# Patient Record
Sex: FEMALE | Race: WHITE | URBAN - METROPOLITAN AREA
[De-identification: names, ages, dates, MRNs, and addresses within clinical notes are randomized per-mention and may not be internally consistent; named-entity substitution may affect disease eponyms.]

---

## 2017-01-01 ENCOUNTER — NURSING HOME VISIT (OUTPATIENT)
Dept: GERIATRICS | Facility: CLINIC | Age: 82
End: 2017-01-01
Payer: COMMERCIAL

## 2017-01-01 ENCOUNTER — DOCUMENTATION ONLY (OUTPATIENT)
Dept: OTHER | Facility: CLINIC | Age: 82
End: 2017-01-01

## 2017-01-01 ENCOUNTER — TRANSFERRED RECORDS (OUTPATIENT)
Dept: HEALTH INFORMATION MANAGEMENT | Facility: CLINIC | Age: 82
End: 2017-01-01

## 2017-01-01 ENCOUNTER — TELEPHONE (OUTPATIENT)
Dept: GERIATRICS | Facility: CLINIC | Age: 82
End: 2017-01-01

## 2017-01-01 ENCOUNTER — NURSING HOME VISIT (OUTPATIENT)
Dept: GERIATRICS | Facility: CLINIC | Age: 82
End: 2017-01-01

## 2017-01-01 VITALS
WEIGHT: 160 LBS | BODY MASS INDEX: 30.21 KG/M2 | HEART RATE: 63 BPM | HEIGHT: 61 IN | TEMPERATURE: 98.4 F | SYSTOLIC BLOOD PRESSURE: 168 MMHG | DIASTOLIC BLOOD PRESSURE: 77 MMHG | RESPIRATION RATE: 18 BRPM

## 2017-01-01 VITALS
SYSTOLIC BLOOD PRESSURE: 155 MMHG | WEIGHT: 139 LBS | HEART RATE: 67 BPM | TEMPERATURE: 98 F | BODY MASS INDEX: 26.28 KG/M2 | DIASTOLIC BLOOD PRESSURE: 94 MMHG | RESPIRATION RATE: 20 BRPM

## 2017-01-01 VITALS
TEMPERATURE: 97.6 F | WEIGHT: 123 LBS | SYSTOLIC BLOOD PRESSURE: 144 MMHG | DIASTOLIC BLOOD PRESSURE: 76 MMHG | BODY MASS INDEX: 23.24 KG/M2 | HEART RATE: 68 BPM

## 2017-01-01 VITALS
BODY MASS INDEX: 25.7 KG/M2 | DIASTOLIC BLOOD PRESSURE: 69 MMHG | WEIGHT: 136 LBS | RESPIRATION RATE: 15 BRPM | SYSTOLIC BLOOD PRESSURE: 150 MMHG | TEMPERATURE: 97.9 F | HEART RATE: 68 BPM

## 2017-01-01 VITALS
RESPIRATION RATE: 20 BRPM | HEIGHT: 61 IN | WEIGHT: 139 LBS | HEART RATE: 64 BPM | TEMPERATURE: 98 F | DIASTOLIC BLOOD PRESSURE: 62 MMHG | BODY MASS INDEX: 26.24 KG/M2 | SYSTOLIC BLOOD PRESSURE: 140 MMHG

## 2017-01-01 VITALS
SYSTOLIC BLOOD PRESSURE: 166 MMHG | WEIGHT: 143 LBS | DIASTOLIC BLOOD PRESSURE: 77 MMHG | HEART RATE: 67 BPM | TEMPERATURE: 97.5 F | HEIGHT: 61 IN | BODY MASS INDEX: 27 KG/M2 | RESPIRATION RATE: 18 BRPM

## 2017-01-01 VITALS
HEART RATE: 107 BPM | HEIGHT: 61 IN | TEMPERATURE: 97.5 F | BODY MASS INDEX: 22.84 KG/M2 | DIASTOLIC BLOOD PRESSURE: 64 MMHG | RESPIRATION RATE: 18 BRPM | SYSTOLIC BLOOD PRESSURE: 137 MMHG | WEIGHT: 121 LBS

## 2017-01-01 VITALS
HEIGHT: 61 IN | TEMPERATURE: 97.7 F | BODY MASS INDEX: 22.84 KG/M2 | DIASTOLIC BLOOD PRESSURE: 64 MMHG | SYSTOLIC BLOOD PRESSURE: 113 MMHG | HEART RATE: 98 BPM | WEIGHT: 121 LBS | RESPIRATION RATE: 14 BRPM

## 2017-01-01 VITALS
WEIGHT: 126 LBS | SYSTOLIC BLOOD PRESSURE: 122 MMHG | DIASTOLIC BLOOD PRESSURE: 65 MMHG | HEIGHT: 61 IN | TEMPERATURE: 97.7 F | RESPIRATION RATE: 16 BRPM | BODY MASS INDEX: 23.79 KG/M2 | HEART RATE: 90 BPM

## 2017-01-01 VITALS
DIASTOLIC BLOOD PRESSURE: 64 MMHG | SYSTOLIC BLOOD PRESSURE: 142 MMHG | HEART RATE: 66 BPM | TEMPERATURE: 97.7 F | RESPIRATION RATE: 16 BRPM | BODY MASS INDEX: 23.62 KG/M2 | WEIGHT: 125 LBS

## 2017-01-01 VITALS
SYSTOLIC BLOOD PRESSURE: 141 MMHG | HEIGHT: 61 IN | TEMPERATURE: 97.7 F | BODY MASS INDEX: 22.84 KG/M2 | DIASTOLIC BLOOD PRESSURE: 67 MMHG | WEIGHT: 121 LBS | HEART RATE: 75 BPM

## 2017-01-01 VITALS — HEART RATE: 98 BPM | RESPIRATION RATE: 20 BRPM

## 2017-01-01 VITALS
SYSTOLIC BLOOD PRESSURE: 125 MMHG | TEMPERATURE: 98 F | WEIGHT: 140 LBS | RESPIRATION RATE: 16 BRPM | BODY MASS INDEX: 26.45 KG/M2 | DIASTOLIC BLOOD PRESSURE: 63 MMHG | HEART RATE: 62 BPM

## 2017-01-01 DIAGNOSIS — R53.2 FUNCTIONAL QUADRIPLEGIA (H): ICD-10-CM

## 2017-01-01 DIAGNOSIS — K59.01 SLOW TRANSIT CONSTIPATION: ICD-10-CM

## 2017-01-01 DIAGNOSIS — F03.90 DEMENTIA WITHOUT BEHAVIORAL DISTURBANCE, UNSPECIFIED DEMENTIA TYPE: ICD-10-CM

## 2017-01-01 DIAGNOSIS — S81.001A OPEN WOUND OF KNEE, LEG, AND ANKLE, RIGHT, INITIAL ENCOUNTER: Primary | ICD-10-CM

## 2017-01-01 DIAGNOSIS — S81.801D WOUND OF RIGHT LEG, SUBSEQUENT ENCOUNTER: ICD-10-CM

## 2017-01-01 DIAGNOSIS — R63.4 LOSS OF WEIGHT: ICD-10-CM

## 2017-01-01 DIAGNOSIS — L02.419 CELLULITIS AND ABSCESS OF LEG: Primary | ICD-10-CM

## 2017-01-01 DIAGNOSIS — S91.101D OPEN WOUND OF RIGHT GREAT TOE, SUBSEQUENT ENCOUNTER: ICD-10-CM

## 2017-01-01 DIAGNOSIS — R23.4 ESCHAR OF HEEL: ICD-10-CM

## 2017-01-01 DIAGNOSIS — I73.9 PVD (PERIPHERAL VASCULAR DISEASE) (H): ICD-10-CM

## 2017-01-01 DIAGNOSIS — S91.001A OPEN WOUND OF KNEE, LEG, AND ANKLE, RIGHT, INITIAL ENCOUNTER: Primary | ICD-10-CM

## 2017-01-01 DIAGNOSIS — L03.115 CELLULITIS OF RIGHT LOWER EXTREMITY: Primary | ICD-10-CM

## 2017-01-01 DIAGNOSIS — Z71.89 ADVANCED DIRECTIVES, COUNSELING/DISCUSSION: Chronic | ICD-10-CM

## 2017-01-01 DIAGNOSIS — R62.7 FAILURE TO THRIVE IN ADULT: Primary | ICD-10-CM

## 2017-01-01 DIAGNOSIS — S81.801D WOUND OF RIGHT LEG, SUBSEQUENT ENCOUNTER: Primary | ICD-10-CM

## 2017-01-01 DIAGNOSIS — L03.119 CELLULITIS AND ABSCESS OF LEG: Primary | ICD-10-CM

## 2017-01-01 DIAGNOSIS — S81.801D WOUND OF RIGHT LOWER EXTREMITY, SUBSEQUENT ENCOUNTER: Primary | ICD-10-CM

## 2017-01-01 DIAGNOSIS — S81.801A OPEN WOUND OF KNEE, LEG, AND ANKLE, RIGHT, INITIAL ENCOUNTER: Primary | ICD-10-CM

## 2017-01-01 DIAGNOSIS — L89.152: ICD-10-CM

## 2017-01-01 DIAGNOSIS — R62.7 ADULT FAILURE TO THRIVE: Primary | ICD-10-CM

## 2017-01-01 DIAGNOSIS — L89.92 DECUBITUS SKIN ULCER, STAGE II: ICD-10-CM

## 2017-01-01 DIAGNOSIS — R63.4 LOSS OF WEIGHT: Primary | ICD-10-CM

## 2017-01-01 DIAGNOSIS — M15.9 OSTEOARTHRITIS OF MULTIPLE JOINTS, UNSPECIFIED OSTEOARTHRITIS TYPE: ICD-10-CM

## 2017-01-01 DIAGNOSIS — I10 BENIGN ESSENTIAL HYPERTENSION: ICD-10-CM

## 2017-01-01 DIAGNOSIS — S81.801S OPEN WOUND OF LOWER LIMB, RIGHT, SEQUELA: Primary | ICD-10-CM

## 2017-01-01 DIAGNOSIS — R23.4 ESCHAR OF TOE: ICD-10-CM

## 2017-01-01 LAB
BUN SERPL-MCNC: 31 MG/DL (ref 9–26)
BUN SERPL-MCNC: 33 MG/DL (ref 9–26)
BUN SERPL-MCNC: 40 MG/DL (ref 9–26)
CALCIUM SERPL-MCNC: 10 MG/DL (ref 8.4–10.2)
CALCIUM SERPL-MCNC: 9.8 MG/DL (ref 8.4–10.2)
CALCIUM SERPL-MCNC: 9.9 MG/DL (ref 8.4–10.2)
CHLORIDE SERPLBLD-SCNC: 102 MMOL/L (ref 98–109)
CHLORIDE SERPLBLD-SCNC: 102 MMOL/L (ref 98–109)
CHLORIDE SERPLBLD-SCNC: 104 MMOL/L (ref 98–109)
CO2 SERPL-SCNC: 22 MMOL/L (ref 22–31)
CREAT SERPL-MCNC: 1.07 MG/DL (ref 0.55–1.02)
CREAT SERPL-MCNC: 1.12 MG/DL (ref 0.55–1.02)
CREAT SERPL-MCNC: 1.19 MG/DL (ref 0.55–1.02)
DIFFERENTIAL: ABNORMAL
DIFFERENTIAL: NORMAL
DIFFERENTIAL: NORMAL
ERYTHROCYTE [DISTWIDTH] IN BLOOD BY AUTOMATED COUNT: 12.9 % (ref 11–15)
ERYTHROCYTE [DISTWIDTH] IN BLOOD BY AUTOMATED COUNT: 13.4 % (ref 11–15)
ERYTHROCYTE [DISTWIDTH] IN BLOOD BY AUTOMATED COUNT: 13.8 % (ref 11–15)
GFR SERPL CREATININE-BSD FRML MDRD: 39 ML/MIN/1.73M2
GFR SERPL CREATININE-BSD FRML MDRD: 42 ML/MIN/1.73M2
GFR SERPL CREATININE-BSD FRML MDRD: 44 ML/MIN/1.73M2
GLUCOSE SERPL-MCNC: 103 MG/DL (ref 70–100)
GLUCOSE SERPL-MCNC: 128 MG/DL (ref 70–100)
GLUCOSE SERPL-MCNC: 167 MG/DL (ref 70–100)
HCT VFR BLD AUTO: 34.8 % (ref 35–46)
HCT VFR BLD AUTO: 39.5 % (ref 35–46)
HCT VFR BLD AUTO: 42 % (ref 35–46)
HEMOGLOBIN: 11.1 G/DL (ref 11.8–15.5)
HEMOGLOBIN: 12.7 G/DL (ref 11.8–15.5)
HEMOGLOBIN: 13.9 G/DL (ref 11.8–15.5)
MCV RBC AUTO: 91.9 FL (ref 80–100)
MCV RBC AUTO: 92.5 FL (ref 80–100)
MCV RBC AUTO: 92.8 FL (ref 80–100)
PLATELET # BLD AUTO: 335 K/CMM (ref 140–450)
PLATELET # BLD AUTO: 348 K/CMM (ref 140–450)
PLATELET # BLD AUTO: 567 K/CMM (ref 140–450)
POTASSIUM SERPL-SCNC: 4.5 MMOL/L (ref 3.5–5.2)
POTASSIUM SERPL-SCNC: 4.8 MMOL/L (ref 3.5–5.2)
POTASSIUM SERPL-SCNC: 5 MMOL/L (ref 3.5–5.2)
RBC # BLD AUTO: 3.75 M/CMM (ref 3.7–5.2)
RBC # BLD AUTO: 4.27 M/CMM (ref 3.7–5.2)
RBC # BLD AUTO: 4.57 M/CMM (ref 3.7–5.2)
SODIUM SERPL-SCNC: 135 MMOL/L (ref 136–145)
SODIUM SERPL-SCNC: 135 MMOL/L (ref 136–145)
SODIUM SERPL-SCNC: 138 MMOL/L (ref 136–145)
WBC # BLD AUTO: 19.1 K/CMM (ref 3.8–11)
WBC # BLD AUTO: 7.8 K/CMM (ref 3.8–11)
WBC # BLD AUTO: 8.6 K/CMM (ref 3.8–11)

## 2017-01-01 PROCEDURE — 99207 ZZC NO CHARGE LOS: CPT | Performed by: INTERNAL MEDICINE

## 2017-01-01 PROCEDURE — 99309 SBSQ NF CARE MODERATE MDM 30: CPT | Performed by: NURSE PRACTITIONER

## 2017-01-01 PROCEDURE — 99310 SBSQ NF CARE HIGH MDM 45: CPT | Mod: GW | Performed by: NURSE PRACTITIONER

## 2017-01-01 PROCEDURE — 99207 ZZC CDG-CORRECTLY CODED, REVIEWED AND AGREE: CPT | Performed by: NURSE PRACTITIONER

## 2017-01-01 PROCEDURE — 99310 SBSQ NF CARE HIGH MDM 45: CPT | Performed by: NURSE PRACTITIONER

## 2017-01-01 PROCEDURE — 99309 SBSQ NF CARE MODERATE MDM 30: CPT | Mod: GW | Performed by: NURSE PRACTITIONER

## 2017-01-01 PROCEDURE — 99318 ZZC ANNUAL NURSING FAC ASSESSMNT, STABLE: CPT | Performed by: NURSE PRACTITIONER

## 2017-01-01 RX ORDER — DOXYCYCLINE HYCLATE 100 MG
100 TABLET ORAL 2 TIMES DAILY
Qty: 20 TABLET | Refills: 0
Start: 2017-01-01 | End: 2017-01-01

## 2017-01-01 RX ORDER — MORPHINE SULFATE 20 MG/ML
SOLUTION ORAL
Qty: 30 ML | Refills: 0
Start: 2017-01-01

## 2017-01-01 RX ORDER — ACETAMINOPHEN 650 MG/1
650 SUPPOSITORY RECTAL EVERY 6 HOURS PRN
COMMUNITY

## 2017-01-01 RX ORDER — DOXYCYCLINE 100 MG/1
100 CAPSULE ORAL 2 TIMES DAILY
Qty: 20 CAPSULE | Refills: 0
Start: 2017-01-01 | End: 2017-01-01

## 2017-01-01 RX ORDER — SENNOSIDES 8.6 MG
1 TABLET ORAL 2 TIMES DAILY PRN
COMMUNITY

## 2017-01-01 RX ORDER — BISACODYL 10 MG
10 SUPPOSITORY, RECTAL RECTAL EVERY OTHER DAY
Qty: 25 SUPPOSITORY | Refills: 1
Start: 2017-01-01

## 2017-01-01 RX ORDER — ATROPINE SULFATE 10 MG/ML
2 SOLUTION/ DROPS OPHTHALMIC EVERY 4 HOURS PRN
COMMUNITY

## 2017-01-01 RX ORDER — ALBUTEROL SULFATE 0.83 MG/ML
1 SOLUTION RESPIRATORY (INHALATION) EVERY 4 HOURS PRN
COMMUNITY

## 2017-01-01 ASSESSMENT — PATIENT HEALTH QUESTIONNAIRE - PHQ9: SUM OF ALL RESPONSES TO PHQ QUESTIONS 1-9: 0

## 2017-01-11 NOTE — PROGRESS NOTES
[facility]    Chief Complaint   Patient presents with     Nursing Home Acute     HPI:    Leigha Keenan is a 89 year old  (1/8/1922), who is being seen today for an episodic care visit at [facility]. Today's concern is:  Open wound of knee, leg, and ankle, right, initial encounter  Asked to see pt today by ns staff.  Pt sustained right leg skin wound today during transfer with EZ stand.  Pt is non verbal due to her dementia.    Functional quadriplegia (HCC), due to advanced dementia  Resident is totally dependent on staff for: Bed/chair repositioning, ADL's, feeding, toileting (incontinent of bowel and bladder), transfers (mechanical lift).  Resident is non-ambulatory and has no purposeful movement.     ALLERGIES: Penicillins and Sulfa drugs  Past Medical, Surgical, Family and Social History reviewed and updated in EPIC.    Current Outpatient Prescriptions   Medication Sig Dispense Refill     lisinopril (PRINIVIL,ZESTRIL) 10 MG tablet 15 mg qd. 90 tablet 1     morphine sulfate HIGH CONCENTRATE (ROXANOL *CONCETRATED*) 100 MG/5ML concentrated solution Take 0.125 mLs (2.5 mg) by mouth every 4 hours as needed for shortness of breath / dyspnea or moderate to severe pain 30 mL 0     metoprolol (LOPRESSOR) 25 MG tablet Take 3 tablets (75 mg) by mouth 2 times daily 60 tablet      amLODIPine (NORVASC) 10 MG tablet Take 1 tablet (10 mg) by mouth daily 90 tablet 3     sodium chloride (OCEAN) 0.65 % nasal spray Spray 2 sprays into both nostrils daily as needed for congestion 2 sprays to each nostril tid and prn dx nasal dryness.       albuterol (2.5 MG/3ML) 0.083% nebulizer solution Take 1 vial by nebulization every 4 hours as needed for shortness of breath / dyspnea or wheezing       ARTIFICIAL TEARS 0.1-0.3 % SOLN Apply 2 drops to eye 3 times daily       Polyethylene Glycol 3350 (MIRALAX PO) Take 17 g by mouth daily.        latanoprost (XALATAN) 0.005 % ophthalmic solution Place 1  drop into both eyes At Bedtime.       acetaminophen (TYLENOL) 500 MG tablet Take 1,000 mg by mouth 3 times daily.       Medications reviewed:  Medications reconciled to facility chart and changes were made to reflect current medications as identified as above med list. Below are the changes that were made:   Medications stopped since last EPIC medication reconciliation:   There are no discontinued medications.    Medications started since last [facility] medication reconciliation:  No orders of the defined types were placed in this encounter.     Patient Active Problem List   Diagnosis     Osteoporosis     Hyperlipidemia     Glaucoma     DJD (degenerative joint disease)     Foreign body in eyeball, left incidentally noted on head CT - MRI contraindicated     Advance Care Planning     Osteoarthritis of multiple joints, unspecified osteoarthritis type     Dementia without behavioral disturbance, unspecified dementia type     Slow transit constipation     Functional quadriplegia (HCC), due to advanced dementia     Benign essential hypertension       REVIEW OF SYSTEMS:  Unobtainable secondary to cognitive impairment or aphasia.    Physical Exam:  /94 mmHg  Pulse 67  Temp(Src) 98  F (36.7  C)  Resp 20  Wt 139 lb (63.05 kg)  GENERAL APPEARANCE: White, female resting in bed.  No verbalization.  Calm.  Sleepy but does open eyes to stimulation. .   SKIN:  Large open wound on right shin involving most of lower leg.  Skin has been superficially torn from most of wound and thin old skin is now resting at edges of wound.  Area is approx 19 cm X 7 cm.  Area cleansed and old skin debrided from wound.  Area then covered with bacitracin, adaptic and gauze. Pt tolerated procedure well with only minimal movement of leg.    Recent Labs:    CBC RESULTS:   Recent Labs   Lab Test 11/21/16 06/20/16 04/06/16 06/20/13   WBC  8.2   --    --   7.8   < >  14   RBC  4.28   --    --   3.93   < >  4.18   HGB  12.9  13.8   < >  11.8   < >   12.9   HCT  39.9   --    --   35.6   < >  38.6   MCV  93.2   --    --   90.6   < >  92   MCH   --    --    --    --    --   30.8   MCHC   --    --    --    --    --   33.4   RDW  13.5   --    --   13.4   < >  14.4   PLT  311   --    --   247   < >  256    < > = values in this interval not displayed.       Last Basic Metabolic Panel:  Recent Labs   Lab Test 11/21/16 10/03/16   NA  138  138   POTASSIUM  4.4  4.2   CHLORIDE  103  103   DAYDAY  9.5  9.7   CO2  23  26   BUN  37  35*   CR  1.32  1.18*   GLC  104*  102*     GFR ESTIMATE   Date Value Ref Range Status   11/21/2016 34 ml/min/1.73m2 Final   10/03/2016 39 >60 ml/min/1.73m2 Final   07/21/2016 46* 61 ml/min/1.73m2 Final   06/20/2016 45* 61 ml/min/1.73m2 Final   04/18/2016 60 ml/min/1.73m2 Final     Assessment/Plan:  (S81.001A,  S81.801A,  S91.001A) Open wound of knee, leg, and ankle, right, initial encounter  (primary encounter diagnosis)  Comment: S/P debridement of old non viable skin  Plan: Cleanse area daily and apply bacitracin, adaptic and gauze.  Reevaluate in am.     (R53.2) Functional quadriplegia (HCC), due to advanced dementia  Comment: Status: Chronic  - decline expected. Functional quadriplegia due to:  End Stage Dementia. Is totally dependent on staff for all cares.   Plan:  Continue current psychological and physical support. Monitor skin integrity, weight and comfort levels. Refer to advanced care plan/POLST. Nsg will change transfer device to a lift from the EZ stand for safety.    Total time spent with patient visit was 25 min including patient visit, phone call to patient contact and wound debridement.. Greater than 50% of total time spent with counseling and coordinating care.    Family Communication:  DaughterUzma, updated.     Electronically signed by  SHELLI Hussein CNP

## 2017-01-12 PROBLEM — I73.9 PVD (PERIPHERAL VASCULAR DISEASE) (H): Status: ACTIVE | Noted: 2017-01-01

## 2017-01-12 NOTE — PROGRESS NOTES
[facility]  Chief Complaint   Patient presents with     Annual Comprehensive Nursing Home     HPI:    Leigha Keenan is a 89 year old  (1/8/1922), who is being seen today for an annual comprehensive visit at [facility] Today's concerns are:  Wound of right lower extremity, subsequent encounter  Pt sustained wound on right shin yesterday during transfer with EZ stand.  Tissue was rubbed off during the injury.  Remains of non viable tissue was debrided and area dressed.  No obvious pain.  Is now being transferred with lift.     PVD (peripheral vascular disease) (H)  Has chronic callous on left great toe tip, but until above noted wound, had no other pedal wounds.  Sees podiatry routinely and last visit was 11/7/16.    Benign essential hypertension  Remains on amlodipine, lisinopril and metoprolol..  BP ranges in past month: 124/72 - 156/70.  No obvious signs of chest pain.    Functional quadriplegia (HCC), due to advanced dementia  Resident is totally dependent on staff for: Bed/chair repositioning, ADL's, feeding, toileting (incontinent of bowel and bladder), transfers (mechanical lift).  Resident is non-ambulatory and has no purposeful movement.      Dementia without behavioral disturbance, unspecified dementia type  End stage disease and relies on staff for all cares.  Family visits often and are advocates for her.  Comfort is goal of tx.     Slow transit constipation  BM's qd - qod.    Osteoarthritis of multiple joints, unspecified osteoarthritis type  Rigid muscle tone of extremities.  No obvious signs of pain with transfers.  Non ambulatory.      PHQ-9 SCORE 1/12/2017   Total Score 0       ALLERGIES: Penicillins and Sulfa drugs  PROBLEM LIST:  Patient Active Problem List   Diagnosis     Osteoporosis     Hyperlipidemia     Glaucoma     DJD (degenerative joint disease)     Foreign body in eyeball, left incidentally noted on head CT - MRI contraindicated     Advance Care  Planning     Osteoarthritis of multiple joints, unspecified osteoarthritis type     Dementia without behavioral disturbance, unspecified dementia type     Slow transit constipation     Functional quadriplegia (HCC), due to advanced dementia     Benign essential hypertension     PVD (peripheral vascular disease) (H)     PAST MEDICAL HISTORY:  has a past medical history of Osteoporosis (1/11/2013); Dementia (1/11/2013); HTN (hypertension) (1/11/2013); Hyperlipidemia (1/11/2013); Glaucoma (1/11/2013); DJD (degenerative joint disease) (1/11/2013); Foreign body in eyeball, left incidentally noted on head CT - MRI contraindicated (1/11/2013); Anxiety (1/11/2013); Pelvic fracture, nondisplaced, right superior and inferior pubic rami, 1/8/13 (1/11/2013); Constipation (5/8/2014); Osteoarthritis of multiple joints (6/24/2015); Functional quadriplegia (HCC), due to advanced dementia (1/14/2016); Benign essential hypertension (5/31/2016); and PVD (peripheral vascular disease) (H) (1/12/2017).  PAST SURGICAL HISTORY:  has past surgical history that includes breast biopsy, rt/lt; Cholecystectomy; and appendectomy.  FAMILY HISTORY: family history includes Breast Cancer in her mother; Neurologic Disorder in her mother and sister.  SOCIAL HISTORY:  reports that she has never smoked. She does not have any smokeless tobacco history on file. She reports that she does not drink alcohol.  IMMUNIZATIONS:  Most Recent Immunizations   Administered Date(s) Administered     Influenza (IIV3) 10/11/2016     Pneumococcal 23 valent 01/01/2000     Tdap (Adacel,Boostrix) 03/10/2010   Deferred Date(s) Deferred     Pneumococcal (PCV 13) 08/26/2016     Above immunizations pulled from [facility]. [facility] and facility records also reconciled.  Future immunizations needed:  yearly influenza per facility protocol  MEDICATIONS:  Current Outpatient Prescriptions   Medication Sig Dispense Refill     lisinopril (PRINIVIL,ZESTRIL) 10 MG tablet 15 mg qd. 90  tablet 1     morphine sulfate HIGH CONCENTRATE (ROXANOL *CONCETRATED*) 100 MG/5ML concentrated solution Take 0.125 mLs (2.5 mg) by mouth every 4 hours as needed for shortness of breath / dyspnea or moderate to severe pain 30 mL 0     metoprolol (LOPRESSOR) 25 MG tablet Take 3 tablets (75 mg) by mouth 2 times daily 60 tablet      amLODIPine (NORVASC) 10 MG tablet Take 1 tablet (10 mg) by mouth daily 90 tablet 3     sodium chloride (OCEAN) 0.65 % nasal spray Spray 2 sprays into both nostrils daily as needed for congestion 2 sprays to each nostril tid and prn dx nasal dryness.       albuterol (2.5 MG/3ML) 0.083% nebulizer solution Take 1 vial by nebulization every 4 hours as needed for shortness of breath / dyspnea or wheezing       ARTIFICIAL TEARS 0.1-0.3 % SOLN Apply 2 drops to eye 3 times daily       Polyethylene Glycol 3350 (MIRALAX PO) Take 17 g by mouth daily.        latanoprost (XALATAN) 0.005 % ophthalmic solution Place 1 drop into both eyes At Bedtime.       acetaminophen (TYLENOL) 500 MG tablet Take 1,000 mg by mouth 3 times daily.       Medications reviewed:  Medications reconciled to facility chart and changes were made to reflect current medications as identified as above med list. Below are the changes that were made:   Medications stopped since last EPIC medication reconciliation:   There are no discontinued medications.    Medications started since last [facility] medication reconciliation:  No orders of the defined types were placed in this encounter.     Case Management:  I have reviewed the facility/SNF care plan/MDS which was done 1/12/17, including the falls risk, nutrition and pain screening. I also reviewed the current immunizations, and preventive care..Future cancer screening is not clinically indicated secondary to age/goals of care Patient's desire to return to the community is not assessible due to cognitive impairment. Current Level of Care is appropriate.    Advance Directive Discussion:    I  "reviewed the current advanced directives as reflected in EPIC and the facility chart. I contacted the first party, Daughter Uzma and discussed the plan of Care. I reviewed the POLST, resigned, dated and sent to [facility].  I did not due to cognitive impairment review the advance directives with the resident.     Team Discussion:  I communicated with the appropriate disciplines involved with the Plan of Care:   Nursing and family    Patient Goal:  Patient's goal is unobtainable secondary to cognitive impairment.    Information reviewed:  Medications, vital signs, orders, and nursing notes.    The health plan new enrollment has happened. I have reviewed the  MDS and facility care plan. The level of care is appropriate.       ROS:  Unobtainable secondary to cognitive impairment or aphasia.    Exam:  /62 mmHg  Pulse 64  Temp(Src) 98  F (36.7  C)  Resp 20  Ht 5' 1\" (1.549 m)  Wt 139 lb (63.05 kg)  BMI 26.28 kg/m2  GENERAL APPEARANCE: White, female resting in bed.  No verbalization.  Calm.  Sleepy but does open eyes to stimulation.  HEAD:  Normocephalic.  No facial asymmetry.  NECK:  Trachea midline.  No palpable lymphadenopathy.   ENT:  Unable to follow commands to open mouth but forward aspect of oral cavity is moist..   EYES:  Sclera clear, conjunctiva pink.  No drainage.  RESP: Quiet, effortless respirations. No cough, but diminished breaths sounds bilateral lower lobes.   CV: RRR, No lower extremity edema. Skin warm and dry. DP pulses +1 bilateral.  BREASTS:  Dense bilateral breast tissue, without palpable masses.  NO axilla masses.  No dimpling.  ABDOMEN: Abdomen is soft, non-tender.  Bowel sounds positive all four quadrants..  NEURO: Unable to fully assess neurological function due to dementia. Unable to follow commands.. Arms resting at sides during exam, but resistive to ROM with rigid muscle tone..     MS: No signs of pain. Unable to perform ROM with both upper and lower extremities as limbs " are stiff and rigid.  SKIN:  Large open wound on right shin involving most of lower leg.    Area is approx 19 cm X 7 cm.  Dressing removed, but had current serous drainage evident. Area cleansed.  No surrounding redness.  Area then covered with bacitracin, adaptic and gauze. Pt tolerated procedure well with only minimal movement of leg.  PSYCH:  Quiet, but arouseable.  Smiling often.    Lab/Diagnostic data:    CBC RESULTS:   Recent Labs   Lab Test 11/21/16 06/20/16 04/06/16 06/20/13   WBC  8.2   --    --   7.8   < >  14   RBC  4.28   --    --   3.93   < >  4.18   HGB  12.9  13.8   < >  11.8   < >  12.9   HCT  39.9   --    --   35.6   < >  38.6   MCV  93.2   --    --   90.6   < >  92   MCH   --    --    --    --    --   30.8   MCHC   --    --    --    --    --   33.4   RDW  13.5   --    --   13.4   < >  14.4   PLT  311   --    --   247   < >  256    < > = values in this interval not displayed.       Last Basic Metabolic Panel:  Recent Labs   Lab Test 11/21/16 10/03/16   NA  138  138   POTASSIUM  4.4  4.2   CHLORIDE  103  103   DAYDAY  9.5  9.7   CO2  23  26   BUN  37  35*   CR  1.32  1.18*   GLC  104*  102*     GFR ESTIMATE   Date Value Ref Range Status   11/21/2016 34 ml/min/1.73m2 Final   10/03/2016 39 >60 ml/min/1.73m2 Final   07/21/2016 46* 61 ml/min/1.73m2 Final   06/20/2016 45* 61 ml/min/1.73m2 Final   04/18/2016 60 ml/min/1.73m2 Final       Depression screen done: PHQ-9 Given screen score and clinical assessment patient is stable without any signs of depression and no futher interventions warrented at this time.    ASSESSMENT/PLAN  (S81.801D) Wound of right lower extremity, subsequent encounter  (primary encounter diagnosis)  Comment: Acute  Plan: Continue current dressing changes and monitoring.    (I73.9) PVD (peripheral vascular disease) (H)  Comment: Chronic  Plan: Monitor wound carefully due to altered circulation and potential for delayed healing.    (I10) Benign essential hypertension  Comment: Based on  JNC-8 goals,  patients age of 89 year old, no presence of diabetes or CKD, and goals of care goal BP is <150/90 mm Hg.  PLAN:  Comfort is goal of care without aggressive intervenitons.  Continue current POC.     (R53.2) Functional quadriplegia (HCC), due to advanced dementia  Comment: Status: Chronic  - decline expected. Functional quadriplegia due to:  End Stage Dementia. Is totally dependent on staff for all cares.   Plan:  Continue current psychological and physical support. Monitor skin integrity, weight and comfort levels. Refer to advanced care plan/POLST.    (F03.90) Dementia without behavioral disturbance, unspecified dementia type  Comment: End stage  Plan: Continue current POC.    (K59.01) Slow transit constipation  Comment: Chronic  Plan: Continue current POC.    (M15.9) Osteoarthritis of multiple joints, unspecified osteoarthritis type  Comment: Chronic  Plan: Continue current POC.    Electronically signed by:  SHELLI Hussein CNP

## 2017-02-07 NOTE — PROGRESS NOTES
"[facility]    Chief Complaint   Patient presents with     Nursing Home Acute     HPI:    Leigha Keenan is a 89 year old  (1/8/1922), who is being seen today for an episodic care visit at [facility]. Today's concern is:  Open wound of lower limb, right, sequela  Nsg reported today that shin wound has \"Leathery\" appearance.  Wound occurred on 1/11 during transfer and sustained a shearing wound.  Had large area including the entire shin of open area, which was to have been cleansed daily, applying bacitracin, adaptic and gauze.  No reports of adverse or slow healing until today. Pt is non verbal and therefore no c/o pain.  No drainage. No reports of fever.     PVD (peripheral vascular disease) (H)  Pt has diminished pulses to LE.    Functional quadriplegia (HCC), due to advanced dementia  Resident is totally dependent on staff for: Bed/chair repositioning, ADL's, feeding, toileting (incontinent of bowel and bladder), transfers (mechanical lift).  Resident is non-ambulatory and has no purposeful movement.     ALLERGIES: Penicillins and Sulfa drugs  Past Medical, Surgical, Family and Social History reviewed and updated in EPIC.    Current Outpatient Prescriptions   Medication Sig Dispense Refill     lisinopril (PRINIVIL,ZESTRIL) 10 MG tablet 15 mg qd. 90 tablet 1     morphine sulfate HIGH CONCENTRATE (ROXANOL *CONCETRATED*) 100 MG/5ML concentrated solution Take 0.125 mLs (2.5 mg) by mouth every 4 hours as needed for shortness of breath / dyspnea or moderate to severe pain 30 mL 0     metoprolol (LOPRESSOR) 25 MG tablet Take 3 tablets (75 mg) by mouth 2 times daily 60 tablet      amLODIPine (NORVASC) 10 MG tablet Take 1 tablet (10 mg) by mouth daily 90 tablet 3     sodium chloride (OCEAN) 0.65 % nasal spray Spray 2 sprays into both nostrils daily as needed for congestion 2 sprays to each nostril tid and prn dx nasal dryness.       albuterol (2.5 MG/3ML) 0.083% nebulizer solution " "Take 1 vial by nebulization every 4 hours as needed for shortness of breath / dyspnea or wheezing       ARTIFICIAL TEARS 0.1-0.3 % SOLN Apply 2 drops to eye 3 times daily       Polyethylene Glycol 3350 (MIRALAX PO) Take 17 g by mouth daily.        latanoprost (XALATAN) 0.005 % ophthalmic solution Place 1 drop into both eyes At Bedtime.       acetaminophen (TYLENOL) 500 MG tablet Take 1,000 mg by mouth 3 times daily.       Medications reviewed:  Medications reconciled to facility chart and changes were made to reflect current medications as identified as above med list. Below are the changes that were made:   Medications stopped since last EPIC medication reconciliation:   There are no discontinued medications.    Medications started since last [facility] medication reconciliation:  No orders of the defined types were placed in this encounter.     Patient Active Problem List   Diagnosis     Osteoporosis     Hyperlipidemia     Glaucoma     DJD (degenerative joint disease)     Foreign body in eyeball, left incidentally noted on head CT - MRI contraindicated     Advance Care Planning     Osteoarthritis of multiple joints, unspecified osteoarthritis type     Dementia without behavioral disturbance, unspecified dementia type     Slow transit constipation     Functional quadriplegia (HCC), due to advanced dementia     Benign essential hypertension     PVD (peripheral vascular disease) (H)       REVIEW OF SYSTEMS:  Unobtainable secondary to cognitive impairment or aphasia.    Physical Exam:  /77 mmHg  Pulse 63  Temp(Src) 98.4  F (36.9  C)  Resp 18  Ht 5' 1\" (1.549 m)  Wt 160 lb (72.576 kg)  BMI 30.25 kg/m2  GENERAL APPEARANCE: White, female sitting up in chair.  No verbalization.  Calm.  Alert.  HEAD:  Normocephalic.  No facial asymmetry..   ENT:  Unable to follow commands to open mouth but forward aspect of oral cavity is moist..   EYES:  Sclera clear, conjunctiva pink.  No drainage.  RESP: Quiet, effortless " respirations. No cough, but diminished breaths sounds bilateral lower lobes.   CV: RRR, No edema of left foot but has +1 pitting of right foot. Skin warm and dry. DP pulses +1 bilateral.  ABDOMEN: Abdomen is soft, non-tender.  Bowel sounds positive all four quadrants..  NEURO: Unable to fully assess neurological function due to dementia. Unable to follow commands.. Arms resting at sides during exam, but resistive to ROM with rigid muscle tone..     MS: No signs of pain. Unable to perform ROM with both upper and lower extremities as limbs are stiff and rigid.  SKIN:  Large  wound on right shin involving most of lower leg.    Area is approx 19 cm X 5 cm. Area has dark, firm eschar covering entire wound bed with surrounding redness and edema of foot. Area cleansed and coveered with bacitracin, adaptic and gauze. Wound team informed and viewed wound as well.   PSYCH:  Quiet.  Smiling often.    Recent Labs:    CBC RESULTS:   Recent Labs   Lab Test 11/21/16 06/20/16 04/06/16 06/20/13   WBC  8.2   --    --   7.8   < >  14   RBC  4.28   --    --   3.93   < >  4.18   HGB  12.9  13.8   < >  11.8   < >  12.9   HCT  39.9   --    --   35.6   < >  38.6   MCV  93.2   --    --   90.6   < >  92   MCH   --    --    --    --    --   30.8   MCHC   --    --    --    --    --   33.4   RDW  13.5   --    --   13.4   < >  14.4   PLT  311   --    --   247   < >  256    < > = values in this interval not displayed.       Last Basic Metabolic Panel:  Recent Labs   Lab Test 11/21/16 10/03/16   NA  138  138   POTASSIUM  4.4  4.2   CHLORIDE  103  103   DAYDAY  9.5  9.7   CO2  23  26   BUN  37  35*   CR  1.32  1.18*   GLC  104*  102*     GFR ESTIMATE   Date Value Ref Range Status   11/21/2016 34 ml/min/1.73m2 Final   10/03/2016 39 >60 ml/min/1.73m2 Final   07/21/2016 46* 61 ml/min/1.73m2 Final   06/20/2016 45* 61 ml/min/1.73m2 Final   04/18/2016 60 ml/min/1.73m2 Final     Assessment/Plan:  (B94.320J) Open wound of lower limb, right, sequela   (primary encounter diagnosis)  Comment: Now with firm eschar and signs of infection.  No healing apparent.  Plan: Facility consultant wound nurse to see tomorrow.  Start doxycycline today.  Stat CBC and BMP.  Monitor VS's Continue wound tx's until seen by the wound nurse.  Facility wound team to follow.    (I73.9) PVD (peripheral vascular disease) (H)  Comment: Chronic, affecting healing.  Plan: Monitor.    (R53.2) Functional quadriplegia (HCC), due to advanced dementia  Comment: Status: Chronic  - decline expected. Functional quadriplegia due to:  End Stage Dementia. Is totally dependent on staff for all cares.   Plan:  Continue current psychological and physical support.Pt is unable to report physical changes or pain and nsg to monitor skin integrity, weight and comfort levels carefully.. Refer to advanced care plan/POLST.    Total time spent with patient visit was 35 min including patient visit and review of past records. Greater than 50% of total time spent with counseling and coordinating care.    Family Communication:  Uzma's  updated with current status.    Electronically signed by  SHELLI Hussein CNP

## 2017-02-10 NOTE — PROGRESS NOTES
[place] [facility]    Chief Complaint   Patient presents with     Nursing Home Acute     HPI:    Leigha Keenan is a 89 year old  (1/8/1922), who is being seen today for an episodic care visit at [facility]. Today's concern is:  Cellulitis and abscess of leg  Started on doxycycline three days ago.  No increase in wound redness. Pt is afebrile.  Wound consultant saw pt and recommended vascular surgeon referral.    PVD (peripheral vascular disease) (H)  Pt has known PVD.  Unclear if she has arterial compromise.  No obvious pain in legs and feet and toes are warm to the touch without rubrous or purple coloring.    Functional quadriplegia (HCC), due to advanced dementia  Comment: Status: Chronic  - decline expected. Functional quadriplegia due to:  End Stage Dementia. Is totally dependent on staff for all cares.   Plan:  Continue current psychological and physical support. Monitor skin integrity, weight and comfort levels. Refer to advanced care plan/POLST      ALLERGIES: Penicillins and Sulfa drugs  Past Medical, Surgical, Family and Social History reviewed and updated in [facility].    Current Outpatient Prescriptions   Medication Sig Dispense Refill     doxycycline (VIBRA-TABS) 100 MG tablet Take 1 tablet (100 mg) by mouth 2 times daily for 10 days 20 tablet 0     lisinopril (PRINIVIL,ZESTRIL) 10 MG tablet 15 mg qd. 90 tablet 1     morphine sulfate HIGH CONCENTRATE (ROXANOL *CONCETRATED*) 100 MG/5ML concentrated solution Take 0.125 mLs (2.5 mg) by mouth every 4 hours as needed for shortness of breath / dyspnea or moderate to severe pain 30 mL 0     metoprolol (LOPRESSOR) 25 MG tablet Take 3 tablets (75 mg) by mouth 2 times daily 60 tablet      amLODIPine (NORVASC) 10 MG tablet Take 1 tablet (10 mg) by mouth daily 90 tablet 3     sodium chloride (OCEAN) 0.65 % nasal spray Spray 2 sprays into both nostrils daily as needed for congestion 2 sprays to each nostril tid and prn dx nasal dryness.        albuterol (2.5 MG/3ML) 0.083% nebulizer solution Take 1 vial by nebulization every 4 hours as needed for shortness of breath / dyspnea or wheezing       ARTIFICIAL TEARS 0.1-0.3 % SOLN Apply 2 drops to eye 3 times daily       Polyethylene Glycol 3350 (MIRALAX PO) Take 17 g by mouth daily.        latanoprost (XALATAN) 0.005 % ophthalmic solution Place 1 drop into both eyes At Bedtime.       acetaminophen (TYLENOL) 500 MG tablet Take 1,000 mg by mouth 3 times daily.       Medications reviewed:  Medications reconciled to facility chart and changes were made to reflect current medications as identified as above med list. Below are the changes that were made:   Medications stopped since last EPIC medication reconciliation:   There are no discontinued medications.    Medications started since last [facility] medication reconciliation:  No orders of the defined types were placed in this encounter.     Patient Active Problem List   Diagnosis     Osteoporosis     Hyperlipidemia     Glaucoma     DJD (degenerative joint disease)     Foreign body in eyeball, left incidentally noted on head CT - MRI contraindicated     Advance Care Planning     Osteoarthritis of multiple joints, unspecified osteoarthritis type     Dementia without behavioral disturbance, unspecified dementia type     Slow transit constipation     Functional quadriplegia (HCC), due to advanced dementia     Benign essential hypertension     PVD (peripheral vascular disease) (H)       REVIEW OF SYSTEMS:  Unobtainable secondary to cognitive impairment or aphasia.    Physical Exam:  There were no vitals taken for this visit.  GENERAL APPEARANCE: White, female sitting up in chair.  No verbalization.  Calm.  Alert. Pink facial color.  HEAD:  Normocephalic.  No facial asymmetry..   ENT:  Unable to follow commands to open mouth but forward aspect of oral cavity is moist..   EYES:  Sclera clear, conjunctiva pink.  No drainage.  RESP: Quiet, effortless respirations. No  cough, but diminished breaths sounds bilateral lower lobes.   CV: RRR, No edema of left foot and right foot edema has decreased to trace. . Skin warm and dry. DP pulses +1 bilateral.  ABDOMEN: Abdomen is soft, non-tender.  Bowel sounds positive all four quadrants..  NEURO: Unable to fully assess neurological function due to dementia. Unable to follow commands.. Arms resting at sides during exam, but resistive to ROM with rigid muscle tone..     MS: No signs of pain. Unable to perform ROM with both upper and lower extremities as limbs are stiff and rigid.  SKIN:  Large  wound on right shin involving most of lower leg.    Area is approx 19 cm X 5 cm. Area has dark, firm eschar covering entire wound bed with surrounding redness and slight warmth. No increase of redness or warmth since 2/7. No drainage.. Area cleansed and coveered with bacitracin, adaptic and gauze. Proximal area of original wound (approx 2 -3 cm). has healed.l.   PSYCH:  Quiet.  Smiling often.    Recent Labs:    CBC RESULTS:   Recent Labs   Lab Test 02/08/17 11/21/16 06/20/13   WBC  7.8  8.2   < >  14   RBC  4.57  4.28   < >  4.18   HGB  13.9  12.9   < >  12.9   HCT  42.0  39.9   < >  38.6   MCV  91.9  93.2   < >  92   MCH   --    --    --   30.8   MCHC   --    --    --   33.4   RDW  12.9  13.5   < >  14.4   PLT  348  311   < >  256    < > = values in this interval not displayed.       Last Basic Metabolic Panel:  Recent Labs   Lab Test 02/08/17 11/21/16   NA  135*  138   POTASSIUM  5.0  4.4   CHLORIDE  102  103   DAYDAY  10.0  9.5   CO2  22  23   BUN  33*  37   CR  1.19*  1.32   GLC  103*  104*     GFR ESTIMATE   Date Value Ref Range Status   02/08/2017 39* >60 ml/min/1.73m2 Final   11/21/2016 34 ml/min/1.73m2 Final   10/03/2016 39 >60 ml/min/1.73m2 Final   07/21/2016 46* 61 ml/min/1.73m2 Final   06/20/2016 45* 61 ml/min/1.73m2 Final     Family Communication:  Spoke at length to daughter Uzma.  Discussed above situation with wound and inability to  predict if this will heal.  Cannot r/o arterial compromise impeding healing.  Issue remains that Leigha's goals of care are comfort focused and family has desired no hospitalization and to treat on site for comfort.  Reviewed options of referral to vascular surgeon or hospitalization vs conservative tx at the facility with antibiotics and local wound care. After discussion, Uzma, is opting to keep her at the facility and will discuss again on Monday following a weekend of tx.  She recognizes that I am unable to predict outcome and that osteomyelitis is possible.  Discussed hospice care as well and she will consider this over the weekend.  Expressed gratitude for call and update.     Assessment/Plan:  (L02.419,  L03.119) Cellulitis and abscess of leg  (primary encounter diagnosis)  Comment: Ongoing  Plan: Continue doxycycline and increase wound tx to bid with cleansing, xeroform, telfa and kerlix.  Reevaluate on Monday and discuss again with daughter.    (I73.9) PVD (peripheral vascular disease) (H)  Comment: Chronic  Plan: Will impede wound healing. Monitor.    (R53.2) Functional quadriplegia (HCC), due to advanced dementia  Comment: Status: Chronic  - decline expected. Functional quadriplegia due to:  End Stage Dementia. Is totally dependent on staff for all cares.   Plan:  Continue current psychological and physical support. Monitor skin integrity, weight and comfort levels. Refer to advanced care plan/POLST.    Total time spent with patient visit was 35 min including patient visit, review of past records, phone call to patient contact and meeting with DON to develop tx plan. Greater than 50% of total time spent with counseling and coordinating care.    Electronically signed by  SHELLI Hussein CNP

## 2017-02-13 NOTE — PROGRESS NOTES
[place] [facility]    Chief Complaint   Patient presents with     RECHECK     HPI:    Leigha Keenan is a 89 year old  (1/8/1922), who is being seen today for an episodic care visit at [facility]. Today's concern is:  Wound of right leg, subsequent encounter  Pt continues with bid dressing changes to large wound on right lower leg.  Eschar is softening.  Remains on doxycycline.  No obvious signs of pain.    PVD (peripheral vascular disease) (H)  Decreased pedal pulses.  No discoloration to distal foot.  Skin is warm and dry.    Functional quadriplegia (HCC), due to advanced dementia  Resident is totally dependent on staff for: Bed/chair repositioning, ADL's, feeding, toileting (incontinent of bowel and bladder), transfers (mechanical lift).  Resident is non-ambulatory and has no purposeful movement.        ALLERGIES: Penicillins and Sulfa drugs  Past Medical, Surgical, Family and Social History reviewed and updated in [facility].    Current Outpatient Prescriptions   Medication Sig Dispense Refill     lisinopril (PRINIVIL,ZESTRIL) 10 MG tablet 15 mg qd. 90 tablet 1     morphine sulfate HIGH CONCENTRATE (ROXANOL *CONCETRATED*) 100 MG/5ML concentrated solution Take 0.125 mLs (2.5 mg) by mouth every 4 hours as needed for shortness of breath / dyspnea or moderate to severe pain 30 mL 0     metoprolol (LOPRESSOR) 25 MG tablet Take 3 tablets (75 mg) by mouth 2 times daily 60 tablet      amLODIPine (NORVASC) 10 MG tablet Take 1 tablet (10 mg) by mouth daily 90 tablet 3     sodium chloride (OCEAN) 0.65 % nasal spray Spray 2 sprays into both nostrils daily as needed for congestion 2 sprays to each nostril tid and prn dx nasal dryness.       albuterol (2.5 MG/3ML) 0.083% nebulizer solution Take 1 vial by nebulization every 4 hours as needed for shortness of breath / dyspnea or wheezing       ARTIFICIAL TEARS 0.1-0.3 % SOLN Apply 2 drops to eye 3 times daily       Polyethylene Glycol 3350 (MIRALAX  "PO) Take 17 g by mouth daily.        latanoprost (XALATAN) 0.005 % ophthalmic solution Place 1 drop into both eyes At Bedtime.       acetaminophen (TYLENOL) 500 MG tablet Take 1,000 mg by mouth 3 times daily.       Medications reviewed:  Medications reconciled to facility chart and changes were made to reflect current medications as identified as above med list. Below are the changes that were made:   Medications stopped since last EPIC medication reconciliation:   There are no discontinued medications.    Medications started since last [facility] medication reconciliation:  No orders of the defined types were placed in this encounter.    Patient Active Problem List   Diagnosis     Osteoporosis     Hyperlipidemia     Glaucoma     DJD (degenerative joint disease)     Foreign body in eyeball, left incidentally noted on head CT - MRI contraindicated     Advance Care Planning     Osteoarthritis of multiple joints, unspecified osteoarthritis type     Dementia without behavioral disturbance, unspecified dementia type     Slow transit constipation     Functional quadriplegia (HCC), due to advanced dementia     Benign essential hypertension     PVD (peripheral vascular disease) (H)       REVIEW OF SYSTEMS:  Unobtainable secondary to cognitive impairment or aphasia.    Physical Exam:  /77  Pulse 67  Temp 97.5  F (36.4  C)  Resp 18  Ht 5' 1\" (1.549 m)  Wt 143 lb (64.9 kg)  BMI 27.02 kg/m2  GENERAL APPEARANCE: White, female sitting up in chair.  No verbalization.  Calm.  Alert. Pink facial color. Seen with nsg manager and asst nsg manager.  HEAD:  Normocephalic.  No facial asymmetry..   ENT:  Unable to follow commands to open mouth but forward aspect of oral cavity is moist..   EYES:  Sclera clear, conjunctiva pink.  No drainage.  RESP: Quiet, effortless respirations. No cough, but diminished breaths sounds bilateral lower lobes.   CV: RRR, No edema of left foot and right foot edema is +1.. . Skin warm and dry. DP pulses +1 " bilateral. Skin of foot is pink without discoloration.  ABDOMEN: Abdomen is soft, non-tender.  Bowel sounds positive all four quadrants..  NEURO: Unable to fully assess neurological function due to dementia. Unable to follow commands.. Arms resting at sides during exam, but resistive to ROM with rigid muscle tone..     MS: No signs of pain. Unable to perform ROM with both upper and lower extremities as limbs are stiff and rigid.  SKIN:  Large  wound on right shin involving most of lower leg.    Area is approx 19 cm X 5 cm. Distal firm eschar is now soft and color is white,  Ngo eschar remains black but is softening.  Surrounding skin of wound shows slight redness and slight warmth. No increase of redness or warmth since 2/7. No drainage.. Area cleansed and coveered with xeroform, telfa and kerlix.  Skin prep applied to intact skin surrounding wound.  Proximal area of original wound (approx 2 -3 cm).is healing.   PSYCH:  Quiet.  Smiling often.    Recent Labs:    CBC RESULTS:   Recent Labs   Lab Test 02/08/17 11/21/16 06/20/13   WBC  7.8  8.2   < >  14   RBC  4.57  4.28   < >  4.18   HGB  13.9  12.9   < >  12.9   HCT  42.0  39.9   < >  38.6   MCV  91.9  93.2   < >  92   MCH   --    --    --   30.8   MCHC   --    --    --   33.4   RDW  12.9  13.5   < >  14.4   PLT  348  311   < >  256    < > = values in this interval not displayed.       Last Basic Metabolic Panel:  Recent Labs   Lab Test 02/08/17 11/21/16   NA  135*  138   POTASSIUM  5.0  4.4   CHLORIDE  102  103   DAYDAY  10.0  9.5   CO2  22  23   BUN  33*  37   CR  1.19*  1.32   GLC  103*  104*     GFR Estimate   Date Value Ref Range Status   02/08/2017 39 (L) >60 ml/min/1.73m2 Final   11/21/2016 34 ml/min/1.73m2 Final   10/03/2016 39 >60 ml/min/1.73m2 Final   07/21/2016 46 (A) 61 ml/min/1.73m2 Final   06/20/2016 45 (A) 61 ml/min/1.73m2 Final     Discussed the wound issue at length with Uzma's  who is a physician.    Assessment/Plan:  (E37.062X) Wound of  right leg, subsequent encounter  (primary encounter diagnosis)  Comment: Chronic with softening eschar.  Plan: Continue dressing changes, but nsg to apply skin prep to surrounding intact skin.    (I73.9) PVD (peripheral vascular disease) (H)  Comment: Chronic, unclear status of blood flow to area that is impeding healing  Plan: Continue to monitor.    (R53.2) Functional quadriplegia (HCC), due to advanced dementia  Comment: Status: Chronic  - decline expected. Functional quadriplegia due to:  End Stage Dementia. Is totally dependent on staff for all cares.   Plan:  Continue current psychological and physical support. Monitor skin integrity, weight and comfort levels. Refer to advanced care plan/POLST.    Total time spent with patient visit was 35 min including patient visit, review of past records and phone call to patient contact. Greater than 50% of total time spent with counseling and coordinating care.    Electronically signed by  SHELLI Hussein CNP

## 2017-02-20 PROBLEM — S91.101A OPEN WOUND OF RIGHT GREAT TOE: Status: ACTIVE | Noted: 2017-01-01

## 2017-02-20 PROBLEM — S81.801D WOUND OF RIGHT LEG, SUBSEQUENT ENCOUNTER: Status: ACTIVE | Noted: 2017-01-01

## 2017-02-20 NOTE — PROGRESS NOTES
[place] [facility]    Chief Complaint   Patient presents with     Wound Check       HPI:    Legiha Keenan is a 89 year old  (1/8/1922), who is being seen today for an episodic care visit at [facility] [place]. Today's concern is:  Wound of right leg, subsequent encounter  Continues with bid dressing changes of cleansing, xeroform and gauze.  Off antibiotics.  Firm eschar is softening.  No drainage    Open wound of right great toe, subsequent encounter  Has open wound of medial aspect of great toe.  Was initially reported as a scratch.    PVD (peripheral vascular disease) (H)  Diminished pedal pulses, but skin is warm to touch without rubrous or purplish discoloration.    Dementia without behavioral disturbance, unspecified dementia type  End stage disease relies on staff for all cares  Non verbal.  Non ambulatory.      ALLERGIES: Penicillins and Sulfa drugs  Past Medical, Surgical, Family and Social History reviewed and updated in EPIC.    Current Outpatient Prescriptions   Medication Sig Dispense Refill     lisinopril (PRINIVIL,ZESTRIL) 10 MG tablet 15 mg qd. 90 tablet 1     morphine sulfate HIGH CONCENTRATE (ROXANOL *CONCETRATED*) 100 MG/5ML concentrated solution Take 0.125 mLs (2.5 mg) by mouth every 4 hours as needed for shortness of breath / dyspnea or moderate to severe pain 30 mL 0     metoprolol (LOPRESSOR) 25 MG tablet Take 3 tablets (75 mg) by mouth 2 times daily 60 tablet      amLODIPine (NORVASC) 10 MG tablet Take 1 tablet (10 mg) by mouth daily 90 tablet 3     sodium chloride (OCEAN) 0.65 % nasal spray Spray 2 sprays into both nostrils daily as needed for congestion 2 sprays to each nostril tid and prn dx nasal dryness.       albuterol (2.5 MG/3ML) 0.083% nebulizer solution Take 1 vial by nebulization every 4 hours as needed for shortness of breath / dyspnea or wheezing       ARTIFICIAL TEARS 0.1-0.3 % SOLN Apply 2 drops to eye 3 times daily       Polyethylene Glycol 3350  (MIRALAX PO) Take 17 g by mouth daily.        latanoprost (XALATAN) 0.005 % ophthalmic solution Place 1 drop into both eyes At Bedtime.       acetaminophen (TYLENOL) 500 MG tablet Take 1,000 mg by mouth 3 times daily.       Medications reviewed:  Medications reconciled to facility chart and changes were made to reflect current medications as identified as above med list. Below are the changes that were made:   Medications stopped since last EPIC medication reconciliation:   There are no discontinued medications.    Medications started since last [facility] medication reconciliation:  No orders of the defined types were placed in this encounter.    Patient Active Problem List   Diagnosis     Osteoporosis     Hyperlipidemia     Glaucoma     DJD (degenerative joint disease)     Foreign body in eyeball, left incidentally noted on head CT - MRI contraindicated     Advance Care Planning     Osteoarthritis of multiple joints, unspecified osteoarthritis type     Dementia without behavioral disturbance, unspecified dementia type     Slow transit constipation     Functional quadriplegia (HCC), due to advanced dementia     Benign essential hypertension     PVD (peripheral vascular disease) (H)     Wound of right leg, subsequent encounter     Open wound of right great toe       REVIEW OF SYSTEMS:  Unobtainable secondary to cognitive impairment or aphasia.    Physical Exam:  /63  Pulse 62  Temp 98  F (36.7  C)  Resp 16  Wt 140 lb (63.5 kg)  BMI 26.45 kg/m2     GENERAL APPEARANCE: White, female sitting up in chair. No verbalization.  Calm.  Alert. Pink facial color. .  HEAD: Normocephalic. No facial asymmetry..   ENT:  Unable to follow commands to open mouth but forward aspect of oral cavity is moist..   EYES:  Sclera clear, conjunctiva pink.  No drainage.  RESP: Quiet, effortless respirations. No cough, but diminished breaths sounds bilateral lower lobes.   CV: RRR, No edema of left foot and right foot edema is trace in the  forefoot.. . Skin warm and dry. DP pulses +1 bilateral. Skin of foot is pink without discoloration.  ABDOMEN: Abdomen is soft, non-tender.  Bowel sounds positive all four quadrants..  NEURO: Unable to fully assess neurological function due to dementia. Unable to follow commands.. Arms resting at sides during exam, but resistive to ROM with rigid muscle tone..     MS: No signs of pain. Unable to perform ROM with both upper and lower extremities as limbs are stiff and rigid.  SKIN:  Large wound on right shin involving most of anterior lower leg.    Area is approx 19 cm X 5 cm. Distal firm eschar is now soft and color is mostly white small amount of debridement below the top section of the wound bed to the lower wound, Ngo eschar remains black with medial edges thicker andproximal  white, but is softening. Surrounding skin of wound shows slight redness and slight warmth. No increase of redness or warmth since 2/7. No drainage.. Area cleansed and covered with xeroform, telfa and kerlix. Skin prep applied to intact skin surrounding wound.  Proximal area of original wound (approx 2 -3 cm).is healing.  On outer great toe, bandaid removed and pt has approx 1.5 cm open area that has small amount of drainage.  Area cleansed and covered with Band-Aid.  PSYCH: Quiet. Smiling often.    Recent Labs:      CBC RESULTS:   Recent Labs   Lab Test 02/13/17 02/08/17 06/20/13   WBC  8.6  7.8   < >  14   RBC  4.27  4.57   < >  4.18   HGB  12.7  13.9   < >  12.9   HCT  39.5  42.0   < >  38.6   MCV  92.5  91.9   < >  92   MCH   --    --    --   30.8   MCHC   --    --    --   33.4   RDW  13.4  12.9   < >  14.4   PLT  335  348   < >  256    < > = values in this interval not displayed.       Last Basic Metabolic Panel:  Recent Labs   Lab Test 02/13/17 02/08/17   NA  135*  135*   POTASSIUM  4.5  5.0   CHLORIDE  102  102   DAYDAY  9.8  10.0   CO2  22  22   BUN  40*  33*   CR  1.12*  1.19*   GLC  128*  103*     GFR Estimate   Date Value Ref Range  Status   02/13/2017 42 (L) >60 ml/min/1.73m2 Final   02/08/2017 39 (L) >60 ml/min/1.73m2 Final   11/21/2016 34 ml/min/1.73m2 Final     Assessment/Plan:  (S81.801D) Wound of right leg, subsequent encounter  (primary encounter diagnosis)  Comment: Ongoing, with some softening of eschar and very small debridement starting  Plan: Continue current tx plan and careful monitoring.  Doxycycline completed.    (S91.101D) Open wound of right great toe, subsequent encounter  Comment: Acute  Plan: Start bid cleansing and application of meta honey and coversite.  Monitor carefully.    (I73.9) PVD (peripheral vascular disease) (H)  Comment: Chronic, which will affect healing of wounds.  Plan:Monitor carefully,    (F03.90) Dementia without behavioral disturbance, unspecified dementia type  Comment: End stage  Plan: Comfort is goal of tx.  Continue current POC.  If pt should decline, family would pursue hospice care.    Total time spent with patient visit was 35 min including patient visit, review of past records and coordination of care with nursing leadership.. Greater than 50% of total time spent with counseling and coordinating care.    Electronically signed by  SHELLI Hussein CNP

## 2017-03-08 NOTE — PROGRESS NOTES
[facility]    Chief Complaint   Patient presents with     RECHECK     HPI:    Leigha Keenan is a 89 year old  (1/8/1922), who is being seen today for an episodic care visit at [facility]. Today's concern is:  Wound of right leg, subsequent encounter  Slow improvement.  Continues with firm eschar over the majority of the large shin wound, but edges are loosening.  No drainage, redness, warmth or odor.  Followed by wound team at facility.    Open wound of right great toe, subsequent encounter  Now covered with firm eschar.  No surrounding redness, warmth or odor. Pt is non ambulatory and relies on staff for all cares.    PVD (peripheral vascular disease) (H)  Altered circulation to lower extremities, but feet are warm and dry and no rubrous discoloration.    Benign essential hypertension  BP ranges in past month: 122/74 - 144/58.  No recent changes to BP meds.    Functional quadriplegia (HCC), due to advanced dementia  Resident is totally dependent on staff for: Bed/chair repositioning, ADL's, feeding, toileting (incontinent of bowel and bladder), transfers (mechanical lift).  Resident is non-ambulatory and has no purposeful movement.      Dementia without behavioral disturbance, unspecified dementia type  End stage dementia.  Relies on staff for all cares.  Family are very involved.  Comfort is goal of tx.    Slow transit constipation  BM's qd - qod.    ALLERGIES: Penicillins and Sulfa drugs  Past Medical, Surgical, Family and Social History reviewed and updated in EPIC.     Current Outpatient Prescriptions   Medication Sig Dispense Refill     lisinopril (PRINIVIL,ZESTRIL) 10 MG tablet 15 mg qd. 90 tablet 1     morphine sulfate HIGH CONCENTRATE (ROXANOL *CONCETRATED*) 100 MG/5ML concentrated solution Take 0.125 mLs (2.5 mg) by mouth every 4 hours as needed for shortness of breath / dyspnea or moderate to severe pain 30 mL 0     metoprolol (LOPRESSOR) 25 MG tablet Take 3  tablets (75 mg) by mouth 2 times daily 60 tablet      amLODIPine (NORVASC) 10 MG tablet Take 1 tablet (10 mg) by mouth daily 90 tablet 3     sodium chloride (OCEAN) 0.65 % nasal spray Spray 2 sprays into both nostrils daily as needed for congestion 2 sprays to each nostril tid and prn dx nasal dryness.       albuterol (2.5 MG/3ML) 0.083% nebulizer solution Take 1 vial by nebulization every 4 hours as needed for shortness of breath / dyspnea or wheezing       ARTIFICIAL TEARS 0.1-0.3 % SOLN Apply 2 drops to eye 3 times daily       Polyethylene Glycol 3350 (MIRALAX PO) Take 17 g by mouth daily.        latanoprost (XALATAN) 0.005 % ophthalmic solution Place 1 drop into both eyes At Bedtime.       acetaminophen (TYLENOL) 500 MG tablet Take 1,000 mg by mouth 3 times daily.       Medications reviewed:  Medications reconciled to facility chart and changes were made to reflect current medications as identified as above med list. Below are the changes that were made:   Medications stopped since last EPIC medication reconciliation:   There are no discontinued medications.    Medications started since last [facility] medication reconciliation:  No orders of the defined types were placed in this encounter.    Patient Active Problem List   Diagnosis     Osteoporosis     Hyperlipidemia     Glaucoma     DJD (degenerative joint disease)     Foreign body in eyeball, left incidentally noted on head CT - MRI contraindicated     Advance Care Planning     Osteoarthritis of multiple joints, unspecified osteoarthritis type     Dementia without behavioral disturbance, unspecified dementia type     Slow transit constipation     Functional quadriplegia (HCC), due to advanced dementia     Benign essential hypertension     PVD (peripheral vascular disease) (H)     Wound of right leg, subsequent encounter     Open wound of right great toe     Patient Active Problem List   Diagnosis     Osteoporosis     Hyperlipidemia     Glaucoma     DJD  (degenerative joint disease)     Foreign body in eyeball, left incidentally noted on head CT - MRI contraindicated     Advance Care Planning     Osteoarthritis of multiple joints, unspecified osteoarthritis type     Dementia without behavioral disturbance, unspecified dementia type     Slow transit constipation     Functional quadriplegia (HCC), due to advanced dementia     Benign essential hypertension     PVD (peripheral vascular disease) (H)     Wound of right leg, subsequent encounter     Open wound of right great toe       REVIEW OF SYSTEMS:  Unobtainable secondary to cognitive impairment or aphasia.    Physical Exam:  /69  Pulse 68  Temp 97.9  F (36.6  C)  Resp 15  Wt 136 lb (61.7 kg)  BMI 25.7 kg/m2  GENERAL APPEARANCE: White, female resting in bed.  Seen with wound team.  No verbalization.  Calm.  Alert. Pink facial color. .  HEAD: Normocephalic. No facial asymmetry..   ENT:  Unable to follow commands to open mouth but forward aspect of oral cavity is moist..   EYES:  Sclera clear, conjunctiva pink.  No drainage.  RESP: Quiet, effortless respirations. No cough, but diminished breaths sounds bilateral lower lobes.   CV: RRR, No edema of lower extremities.  . Skin warm and dry. DP pulses +1 bilateral. Skin of foot is pink without discoloration.  ABDOMEN: Abdomen is soft, non-tender.  Bowel sounds positive all four quadrants..  SKIN:  Large wound on right shin involving most of anterior lower leg.    Entire area is approx 24 cm X 4 cm. Most of area is  firm eschar that is now starting to lift at the edges.  Small area between distal eschar and shin eschar is granulating tissue and appears to be healing.   No surrounding redness, drainage, warmth and no odor.   Medial aspect of right great toe has approximately 2 mc X 0.8 cm firm light brown eschar.  No surrounding redness or warmth.  Shin area cleansed and covered with xeroform and kerlix. Skin prep applied to intact skin surrounding wound.  .  PSYCH:  Quiet..    Recent Labs:    CBC RESULTS:   Recent Labs   Lab Test 02/13/17 02/08/17 06/20/13   WBC  8.6  7.8   < >  14   RBC  4.27  4.57   < >  4.18   HGB  12.7  13.9   < >  12.9   HCT  39.5  42.0   < >  38.6   MCV  92.5  91.9   < >  92   MCH   --    --    --   30.8   MCHC   --    --    --   33.4   RDW  13.4  12.9   < >  14.4   PLT  335  348   < >  256    < > = values in this interval not displayed.       Last Basic Metabolic Panel:  Recent Labs   Lab Test 02/13/17 02/08/17   NA  135*  135*   POTASSIUM  4.5  5.0   CHLORIDE  102  102   DAYDAY  9.8  10.0   CO2  22  22   BUN  40*  33*   CR  1.12*  1.19*   GLC  128*  103*     GFR Estimate   Date Value Ref Range Status   02/13/2017 42 (L) >60 ml/min/1.73m2 Final   02/08/2017 39 (L) >60 ml/min/1.73m2 Final   11/21/2016 34 ml/min/1.73m2 Final   10/03/2016 39 >60 ml/min/1.73m2 Final   07/21/2016 46 (A) 61 ml/min/1.73m2 Final     Family Communication:  Message left for daughter regarding status.    Assessment/Plan:  (S81.801D) Wound of right leg, subsequent encounter  (primary encounter diagnosis)  Comment: Chronic, with slow improvement.  Plan: Continue current wound care and monitoring by wound team.  Dr. Perla to see on 3/23.    (S91.101D) Open wound of right great toe, subsequent encounter  Comment: Chronic, with slow improvement  Plan: Continue current wound care and monitoring.    (I73.9) PVD (peripheral vascular disease) (H)  Comment: Chronic, slowing healing of above wounds, but they are improving.  Plan: Continue current POC.    (I10) Benign essential hypertension  Comment: Chronic, BP goal: <150/90, at goal  Plan: Continue current POC.  BMP Thursday.    (R53.2) Functional quadriplegia (HCC), due to advanced dementia  Comment: Status: Chronic  - decline expected. Functional quadriplegia due to:  End Stage Dementia. Is totally dependent on staff for all cares.   Plan:  Continue current psychological and physical support. Monitor skin integrity, weight and comfort  levels. Refer to advanced care plan/POLST.    (F03.90) Dementia without behavioral disturbance, unspecified dementia type  Comment: Advanced  Plan: Continue with full assist with cares and comfort as goal    (K59.01) Slow transit constipation  Comment: Chronic  Plan: Nsg to use house prn orders today.    Total time spent with patient visit was 35 min including patient visit, review of past records and phone call to patient contact. Greater than 50% of total time spent with counseling and coordinating care.    Electronically signed by  SHELLI Hussein CNP

## 2017-03-14 NOTE — PROGRESS NOTES
[facility]    Chief Complaint   Patient presents with     RECHECK     HPI:    Leigha Keenan is a 89 year old  (1/8/1922), who is being seen today for an episodic care visit at [facility]. Today's concern is:  Wound of right leg, subsequent encounter  Pt seen today with the wound team.  Her right leg wound continues to slowly heal.  Current treatment is cleansing, xeroform and dressing. Eschar area has now divided into two wounds with healing area between the eschar area.    PVD (peripheral vascular disease) (H)  Diminished pulses.  Pedal skin is warm and dry.    Open wound of right great toe, subsequent encounter  Ongoing eschar area of right medial great toe.  No change in wound measurements.    Functional quadriplegia (HCC), due to advanced dementia  Resident is totally dependent on staff for: Bed/chair repositioning, ADL's, feeding, toileting (incontinent of bowel and bladder), transfers (mechanical lift).  Resident is non-ambulatory and has no purposeful movement.      Loss of weight  Weight in EMR reflects 10-15 lbs weight loss in past month.  Nsg assts are recording % of meals being eaten.  Is fed.    ALLERGIES: Penicillins and Sulfa drugs  Past Medical, Surgical, Family and Social History reviewed and updated in EPIC.    Current Outpatient Prescriptions   Medication Sig Dispense Refill     lisinopril (PRINIVIL,ZESTRIL) 10 MG tablet 15 mg qd. 90 tablet 1     morphine sulfate HIGH CONCENTRATE (ROXANOL *CONCETRATED*) 100 MG/5ML concentrated solution Take 0.125 mLs (2.5 mg) by mouth every 4 hours as needed for shortness of breath / dyspnea or moderate to severe pain 30 mL 0     metoprolol (LOPRESSOR) 25 MG tablet Take 3 tablets (75 mg) by mouth 2 times daily 60 tablet      amLODIPine (NORVASC) 10 MG tablet Take 1 tablet (10 mg) by mouth daily 90 tablet 3     sodium chloride (OCEAN) 0.65 % nasal spray Spray 2 sprays into both nostrils daily as needed for congestion  2 sprays to each nostril tid and prn dx nasal dryness.       albuterol (2.5 MG/3ML) 0.083% nebulizer solution Take 1 vial by nebulization every 4 hours as needed for shortness of breath / dyspnea or wheezing       ARTIFICIAL TEARS 0.1-0.3 % SOLN Apply 2 drops to eye 3 times daily       Polyethylene Glycol 3350 (MIRALAX PO) Take 17 g by mouth daily.        latanoprost (XALATAN) 0.005 % ophthalmic solution Place 1 drop into both eyes At Bedtime.       acetaminophen (TYLENOL) 500 MG tablet Take 1,000 mg by mouth 3 times daily.       Medications reviewed:  Medications reconciled to facility chart and changes were made to reflect current medications as identified as above med list. Below are the changes that were made:   Medications stopped since last EPIC medication reconciliation:   There are no discontinued medications.    Medications started since last [facility] medication reconciliation:  No orders of the defined types were placed in this encounter.    Patient Active Problem List   Diagnosis     Osteoporosis     Hyperlipidemia     Glaucoma     DJD (degenerative joint disease)     Foreign body in eyeball, left incidentally noted on head CT - MRI contraindicated     Advance Care Planning     Osteoarthritis of multiple joints, unspecified osteoarthritis type     Dementia without behavioral disturbance, unspecified dementia type     Slow transit constipation     Functional quadriplegia (HCC), due to advanced dementia     Benign essential hypertension     PVD (peripheral vascular disease) (H)     Wound of right leg, subsequent encounter     Open wound of right great toe       REVIEW OF SYSTEMS:  Unobtainable secondary to cognitive impairment or aphasia.    Physical Exam:  /64  Pulse 66  Temp 97.7  F (36.5  C)  Resp 16  Wt 125 lb (56.7 kg)  BMI 23.62 kg/m2  GENERAL APPEARANCE: White, female resting in bed.  Seen with wound team.  No verbalization.  Calm.  Alert. Pink facial color. .  HEAD: Normocephalic. No facial  asymmetry..   ENT:  Unable to follow commands to open mouth but forward aspect of oral cavity is moist..   EYES:  Sclera clear, conjunctiva pink.  No drainage.  RESP: Quiet, effortless respirations. No cough, but diminished breaths sounds bilateral lower lobes.   CV: RRR, No edema of lower extremities.  . Skin warm and dry. DP pulses +1 bilateral. Skin of foot is pink without discoloration.  ABDOMEN: Abdomen is soft, non-tender.  Bowel sounds positive all four quadrants..  SKIN:  Large wound on right shin involving most of anterior lower leg has now split into two with a small area of healing, non eschar tissue between the distal and proximal wounds.  Proximal wound is approx 15 X 4 and distal area is approx 5 X2 cm.  Toe is 2 X 1 cm. Area between distal eschar and shin eschar is granulating tissue and appears to be healing.   Shin area cleansed and covered with xeroform and kerlix. Skin prep applied to intact skin surrounding wound.  .  PSYCH: Quiet..    Recent Labs:    CBC RESULTS:   Recent Labs   Lab Test 02/13/17 02/08/17 06/20/13   WBC  8.6  7.8   < >  14   RBC  4.27  4.57   < >  4.18   HGB  12.7  13.9   < >  12.9   HCT  39.5  42.0   < >  38.6   MCV  92.5  91.9   < >  92   MCH   --    --    --   30.8   MCHC   --    --    --   33.4   RDW  13.4  12.9   < >  14.4   PLT  335  348   < >  256    < > = values in this interval not displayed.       Last Basic Metabolic Panel:  Recent Labs   Lab Test 02/13/17 02/08/17   NA  135*  135*   POTASSIUM  4.5  5.0   CHLORIDE  102  102   DAYDAY  9.8  10.0   CO2  22  22   BUN  40*  33*   CR  1.12*  1.19*   GLC  128*  103*     GFR Estimate   Date Value Ref Range Status   02/13/2017 42 (L) >60 ml/min/1.73m2 Final   02/08/2017 39 (L) >60 ml/min/1.73m2 Final   11/21/2016 34 ml/min/1.73m2 Final   10/03/2016 39 >60 ml/min/1.73m2 Final   07/21/2016 46 (A) 61 ml/min/1.73m2 Final     Family Communication: Daughter Uzma updated    Assessment/Plan:  (G59.527D) Wound of right leg, subsequent  encounter  (primary encounter diagnosis)  Comment: SLowly healing  Plan: Continue current wound care.  Add supplement due to weight loss.    (I73.9) PVD (peripheral vascular disease) (H)  Comment: Chronic, likely to affect healing of wound.  Plan: Continue current POC.    (S91.101D) Open wound of right great toe, subsequent encounter  Comment: Now with eschar  Plan: Continue current POC    (R53.2) Functional quadriplegia (HCC), due to advanced dementia  Comment: Status: Chronic  - decline expected. Functional quadriplegia due to:  End Stage Dementia. Is totally dependent on staff for all cares.   Plan:  Continue current psychological and physical support. Monitor skin integrity, weight and comfort levels. Refer to advanced care plan/POLST.  Comfort is goal of tx.    (R63.4) Loss of weight  Comment: Acute, likely due to advancing dementia  Plan: Start NDS 4 oz bid.  If weight loss continues, consider hospice care.    Total time spent with patient visit was 35 min including patient visit, review of past records and phone call to patient contact. Greater than 50% of total time spent with counseling and coordinating care.    Electronically signed by  SHELLI Hussein CNP

## 2017-03-20 NOTE — TELEPHONE ENCOUNTER
Discussed current status.  Has new open area on coccyx.  Today family was updated and told that she is not eating and declining. Family is considering hospice care, but had questions that need to be explored with the nsg management team. Will see pt tomorrow and contact Uzma at that time to discuss. Family is considering hospice care.

## 2017-03-20 NOTE — TELEPHONE ENCOUNTER
Not eating and declining.  Family is considering hospice care, but had questions that need to be explored with the nsg management team.  Will see pt tomorrow and contact Uzma at that time to discuss.  Family is considering hospice care.

## 2017-03-21 PROBLEM — R63.4 LOSS OF WEIGHT: Status: ACTIVE | Noted: 2017-01-01

## 2017-03-21 PROBLEM — N18.30 CKD (CHRONIC KIDNEY DISEASE) STAGE 3, GFR 30-59 ML/MIN (H): Status: ACTIVE | Noted: 2017-01-01

## 2017-03-21 PROBLEM — L89.92: Status: ACTIVE | Noted: 2017-01-01

## 2017-03-22 NOTE — TELEPHONE ENCOUNTER
Uzma updated today on change in condition in past 24 hours for her mom.  She will expedite the hospice enrollment for her mom.  Notified of start of doxycycline.  She expressed understanding of concerns and is planning to see her mom shortly.

## 2017-03-25 NOTE — PROGRESS NOTES
Pt seen for a regulatory visit  Case reviewed with NP    Pt's status has declined over the last several weeks.  She will be entering hospice today  She incurred a significant wound to her R shin and ankle several weeks ago, resulting in a large area skin denudement    Exam  In NAD, but grimaces when R LE is examined  There is extensive eschar present over R anterior tib area, with some healing of proximal of abrasion. Over ankle, tissue is soupy appearing, + erythema over distal ankle to proximal foot. + serous drainage      Assessment    Dementia, advanced, with recent deterioration in status  Extensive R LE skin injury with prob cellulitis involving distal aspect of shin and ankle    Plan  Continue wound cares  Doxycycline has been started  Hospice is appropriate in view of acute on chronic decline in status is setting of advanced dementia.

## 2017-03-27 NOTE — PROGRESS NOTES
[place] [facility]    Chief Complaint   Patient presents with     RECHECK       HPI:    Leigha Keenan is a 89 year old  (1/8/1922), who is being seen today for an episodic care visit at [facility]. Today's concern is:  Adult failure to thrive  Continues with slow weight loss.  Now on hospice care.  Comfort is goal of all interventions. Appetite is variable with some meals eating nothing and others eating %.    Dementia without behavioral disturbance, unspecified dementia type  End stage dementia requiring full assist of cares.     Slow transit constipation  BM's qd-qod,    Wound of right leg, subsequent encounter  Continues with right leg eschar that is softening.  Has completed course of doxycycline for tx of possible cellulitis.  Hospice moved dressing changes to once a day due to possible pain with the procedure.      ALLERGIES: Penicillins and Sulfa drugs  Past Medical, Surgical, Family and Social History reviewed and updated in [employer].    Current Outpatient Prescriptions   Medication Sig Dispense Refill     LORazepam (ATIVAN PO) Take 0.5 mg by mouth every 4 hours as needed for anxiety       HALOPERIDOL PO Take 0.5 mg by mouth every 4 hours as needed for agitation       atropine 1 % ophthalmic solution Place 2 drops under the tongue every 4 hours as needed       PROCHLORPERAZINE MALEATE PO Take 10 mg by mouth every 6 hours as needed for nausea or vomiting       acetaminophen (TYLENOL) 650 MG Suppository Place 650 mg rectally every 6 hours as needed for fever       sennosides (SENOKOT) 8.6 MG tablet Take 1 tablet by mouth 2 times daily as needed for constipation       doxycycline (VIBRAMYCIN) 100 MG capsule Take 1 capsule (100 mg) by mouth 2 times daily 20 capsule 0     Nutritional Supplements (NUTRITIONAL SUPPLEMENT PO) Take 4 oz by mouth 2 times daily       lisinopril (PRINIVIL,ZESTRIL) 10 MG tablet 15 mg qd. 90 tablet 1     morphine sulfate HIGH CONCENTRATE (ROXANOL  *CONCETRATED*) 100 MG/5ML concentrated solution Take 0.125 mLs (2.5 mg) by mouth every 4 hours as needed for shortness of breath / dyspnea or moderate to severe pain 30 mL 0     metoprolol (LOPRESSOR) 25 MG tablet Take 3 tablets (75 mg) by mouth 2 times daily 60 tablet      amLODIPine (NORVASC) 10 MG tablet Take 1 tablet (10 mg) by mouth daily 90 tablet 3     albuterol (2.5 MG/3ML) 0.083% nebulizer solution Take 1 vial by nebulization every 4 hours as needed for shortness of breath / dyspnea or wheezing       ARTIFICIAL TEARS 0.1-0.3 % SOLN Apply 2 drops to eye 3 times daily       Polyethylene Glycol 3350 (MIRALAX PO) Take 17 g by mouth daily.        latanoprost (XALATAN) 0.005 % ophthalmic solution Place 1 drop into both eyes At Bedtime.       acetaminophen (TYLENOL) 500 MG tablet Take 1,000 mg by mouth 3 times daily.       Medications reviewed:  Medications reconciled to facility chart and changes were made to reflect current medications as identified as above med list. Below are the changes that were made:   Medications stopped since last EPIC medication reconciliation:   Medications Discontinued During This Encounter   Medication Reason     sodium chloride (OCEAN) 0.65 % nasal spray        Medications started since last [facility] medication reconciliation:  Orders Placed This Encounter   Medications     LORazepam (ATIVAN PO)     Sig: Take 0.5 mg by mouth every 4 hours as needed for anxiety     HALOPERIDOL PO     Sig: Take 0.5 mg by mouth every 4 hours as needed for agitation     atropine 1 % ophthalmic solution     Sig: Place 2 drops under the tongue every 4 hours as needed     PROCHLORPERAZINE MALEATE PO     Sig: Take 10 mg by mouth every 6 hours as needed for nausea or vomiting     acetaminophen (TYLENOL) 650 MG Suppository     Sig: Place 650 mg rectally every 6 hours as needed for fever     sennosides (SENOKOT) 8.6 MG tablet     Sig: Take 1 tablet by mouth 2 times daily as needed for constipation     Patient  "Active Problem List   Diagnosis     Osteoporosis     Hyperlipidemia     Glaucoma     DJD (degenerative joint disease)     Foreign body in eyeball, left incidentally noted on head CT - MRI contraindicated     Advance Care Planning     Osteoarthritis of multiple joints, unspecified osteoarthritis type     Dementia without behavioral disturbance, unspecified dementia type     Slow transit constipation     Functional quadriplegia (HCC), due to advanced dementia     Benign essential hypertension     PVD (peripheral vascular disease) (H)     Wound of right leg, subsequent encounter     Open wound of right great toe     Loss of weight     Decubitus skin ulcer, stage II, coccyx     CKD (chronic kidney disease) stage 3, GFR 30-59 ml/min, GFR: 10/3/16: 39, 2/8/17: 39       REVIEW OF SYSTEMS:  Unobtainable secondary to cognitive impairment or aphasia.      Physical Exam:  /67  Pulse 75  Temp 97.7  F (36.5  C)  Ht 5' 1\" (1.549 m)  Wt 121 lb (54.9 kg)  BMI 22.86 kg/m2  GENERAL APPEARANCE: White, female resting in bed.  No verbalization.  Calm.  Alert. Pink facial color .   ENT:  Unable to follow commands to open mouth but forward aspect of oral cavity is moist..   EYES:  Sclera clear, conjunctiva pink.  No drainage.  RESP: Quiet, effortless respirations. No cough, but diminished breaths sounds bilateral lower lobes.   CV: RRR, No edema of lower extremities.  . Skin warm and dry. DP pulses +1 bilateral. Skin of foot is pink without discoloration.  ABDOMEN: Abdomen is soft, non-tender.  Bowel sounds positive all four quadrants..  SKIN:  Dressing removed from lower leg.  Lower area of eschar which had  from leg eschar is now is two sections and as area was cleansed the lateral aspect of the eschar fell off.  Tissue is approx 60% white with some pink granulation noted distally.  Medial aspect of this eschar remains soft but intact.  Ngo wound cleansed and lower aspect lifted away from wound bed and approx 3 cm " of loose eschar removed. Remainder of eschar is soft but firmly affixed to wound bed.  Great toe medial eschar remains.   Coccyx wound has intact replicare dressing in place. .  PSYCH: Quiet but awake..    Recent Labs:    CBC RESULTS:   Recent Labs   Lab Test 03/23/17 02/13/17 06/20/13   WBC  19.1*  8.6   < >  14   RBC  3.75  4.27   < >  4.18   HGB  11.1*  12.7   < >  12.9   HCT  34.8*  39.5   < >  38.6   MCV  92.8  92.5   < >  92   MCH   --    --    --   30.8   MCHC   --    --    --   33.4   RDW  13.8  13.4   < >  14.4   PLT  567*  335   < >  256    < > = values in this interval not displayed.       Last Basic Metabolic Panel:  Recent Labs   Lab Test 03/23/17 02/13/17   NA  138  135*   POTASSIUM  4.8  4.5   CHLORIDE  104  102   DAYDAY  9.9  9.8   CO2  22  22   BUN  31*  40*   CR  1.07*  1.12*   GLC  167*  128*     GFR Estimate   Date Value Ref Range Status   03/23/2017 44 (L) >60 mL/min/1.73m2 Final   02/13/2017 42 (L) >60 ml/min/1.73m2 Final   02/08/2017 39 (L) >60 ml/min/1.73m2 Final   11/21/2016 34 ml/min/1.73m2 Final   10/03/2016 39 >60 ml/min/1.73m2 Final       Assessment/Plan:  (R62.7) Adult failure to thrive  (primary encounter diagnosis)  Comment: Ongoing  Plan: Continue hospice care.    (F03.90) Dementia without behavioral disturbance, unspecified dementia type  Comment: Advanced  Plan: Continue full assist with ADL's and hospice care    (K59.01) Slow transit constipation  Comment: Chronic but controlled  Plan: Continue POC.    (S81.801D) Wound of right leg, subsequent encounter  Comment: Eschar now loosening  Plan: Continue current POC.  Anticipate further sloughing of eschar.  Now that tissue is exposed monitor for increased drainage.  Situation discussed with hospice and OK to continue daily dressings, but if drainage increases, need to increase frequency to bid or even every shift.    Total time spent with patient visit was 35 min including patient visit, review of past records and discussion with nsg  leadership and hospice.. Greater than 50% of total time spent with counseling and coordinating care.    Electronically signed by  SHELLI Hussein CNP

## 2017-03-29 NOTE — PROGRESS NOTES
[place] [facility]    Chief Complaint   Patient presents with     Nursing Home Acute     HPI:    Leigha Keenan is a 89 year old  (1/8/1922), who is being seen today for an episodic care visit at [facility]. Today's concern is:  Wound of right leg, subsequent encounter  Seen today with wound team.  Right murphy wound eschar has lifted in part of the distal wound.  Current wound measurement is20 cm X4.5cm with 70% eschar, 20% slough and 10 % granulating tissue.  Mild redness below distal aspect of wound.  Unclear if this is related to mechanical or reactive process.  Minimal exudate. Afebrile.  Remains on antibiotic.    Eschar of heel  Dark horizontal eschar of right heel measuring 4 cm wide X 0.8 cm.  No surrounding redness.  Area is cleansed and skin prep applied.  Wears pressure reduction boots at all times.    Eschar of toe  Continues with firm eschar of right medial great toe measuring 2.7 X 1 cm.  No surrounding redness.  Area cleansed and tx changed to Xeroform and dressing.    Decubitus ulcer of sacral region, stage II  Open area measures 3cm X2cm with central dark eschar.  No drainage.  Has linear area of redness extending from wound laterally into her left buttock.  This area (measured from edge of wound bed) is 9.5 cm X 2 cm.  Area is closed and redness is clearly demarcated.     Functional quadriplegia (HCC), due to advanced dementia  Resident is totally dependent on staff for: Bed/chair repositioning, ADL's, feeding, toileting (incontinent of bowel and bladder), transfers (mechanical lift).  Resident is non-ambulatory and has no purposeful movement.     ALLERGIES: Penicillins and Sulfa drugs  Past Medical, Surgical, Family and Social History reviewed and updated in [facility].    Current Outpatient Prescriptions   Medication Sig Dispense Refill     LORazepam (ATIVAN PO) Take 0.5 mg by mouth every 4 hours as needed for anxiety       HALOPERIDOL PO Take 0.5 mg by mouth every 4  hours as needed for agitation       atropine 1 % ophthalmic solution Place 2 drops under the tongue every 4 hours as needed       PROCHLORPERAZINE MALEATE PO Take 10 mg by mouth every 6 hours as needed for nausea or vomiting       acetaminophen (TYLENOL) 650 MG Suppository Place 650 mg rectally every 6 hours as needed for fever       sennosides (SENOKOT) 8.6 MG tablet Take 1 tablet by mouth 2 times daily as needed for constipation       doxycycline (VIBRAMYCIN) 100 MG capsule Take 1 capsule (100 mg) by mouth 2 times daily 20 capsule 0     Nutritional Supplements (NUTRITIONAL SUPPLEMENT PO) Take 4 oz by mouth 2 times daily       lisinopril (PRINIVIL,ZESTRIL) 10 MG tablet 15 mg qd. 90 tablet 1     morphine sulfate HIGH CONCENTRATE (ROXANOL *CONCETRATED*) 100 MG/5ML concentrated solution Take 0.125 mLs (2.5 mg) by mouth every 4 hours as needed for shortness of breath / dyspnea or moderate to severe pain 30 mL 0     metoprolol (LOPRESSOR) 25 MG tablet Take 3 tablets (75 mg) by mouth 2 times daily 60 tablet      amLODIPine (NORVASC) 10 MG tablet Take 1 tablet (10 mg) by mouth daily 90 tablet 3     albuterol (2.5 MG/3ML) 0.083% nebulizer solution Take 1 vial by nebulization every 4 hours as needed for shortness of breath / dyspnea or wheezing       ARTIFICIAL TEARS 0.1-0.3 % SOLN Apply 2 drops to eye 3 times daily       Polyethylene Glycol 3350 (MIRALAX PO) Take 17 g by mouth daily.        latanoprost (XALATAN) 0.005 % ophthalmic solution Place 1 drop into both eyes At Bedtime.       acetaminophen (TYLENOL) 500 MG tablet Take 1,000 mg by mouth 3 times daily.       Medications reviewed:  Medications reconciled to facility chart and changes were made to reflect current medications as identified as above med list. Below are the changes that were made:   Medications stopped since last EPIC medication reconciliation:   There are no discontinued medications.    Medications started since last [facility] medication reconciliation:  No  orders of the defined types were placed in this encounter.    Patient Active Problem List   Diagnosis     Osteoporosis     Hyperlipidemia     Glaucoma     DJD (degenerative joint disease)     Foreign body in eyeball, left incidentally noted on head CT - MRI contraindicated     Advance Care Planning     Osteoarthritis of multiple joints, unspecified osteoarthritis type     Dementia without behavioral disturbance, unspecified dementia type     Slow transit constipation     Functional quadriplegia (HCC), due to advanced dementia     Benign essential hypertension     PVD (peripheral vascular disease) (H)     Wound of right leg, subsequent encounter     Open wound of right great toe     Loss of weight     Decubitus skin ulcer, stage II, coccyx     CKD (chronic kidney disease) stage 3, GFR 30-59 ml/min, GFR: 10/3/16: 39, 2/8/17: 39       REVIEW OF SYSTEMS:  Unobtainable secondary to cognitive impairment or aphasia.    Physical Exam:  /76  Pulse 68  Temp 97.6  F (36.4  C)  Wt 123 lb (55.8 kg)  BMI 23.24 kg/m2  GENERAL APPEARANCE: White, female resting in bed.  No verbalization.  Calm.  Eyes closed throughout much of visit. Pink facial color .   ENT:  Unable to follow commands to open mouth but forward aspect of oral cavity is moist..   EYES:  Sclera clear, conjunctiva pink.  No drainage.  RESP: Quiet, effortless respirations. No cough, but diminished breaths sounds bilateral lower lobes.   CV: RRR, No edema of lower extremities.  . Skin warm and dry. DP pulses +1 bilateral. Skin of foot is pink without discoloration.  ABDOMEN: Abdomen is soft, non-tender.  Bowel sounds positive all four quadrants..  SKIN:  Dressing removed from lower leg. See above measurements.  Lower area of lateral aspect of wound is now open and is slough.  Medial to this area remains dark eschar with a small area of granulating tissue between this eschar and the eschar of the shin.  Eschar of toe and heel as noted above.  Coccyx wound as  noted above. Pt appeared fairly comfortable during dressing change.  Did move leg when medial aspect of eschar on shin was cleansed. .    Recent Labs:    CBC RESULTS:   Recent Labs   Lab Test 03/23/17 02/13/17 06/20/13   WBC  19.1*  8.6   < >  14   RBC  3.75  4.27   < >  4.18   HGB  11.1*  12.7   < >  12.9   HCT  34.8*  39.5   < >  38.6   MCV  92.8  92.5   < >  92   MCH   --    --    --   30.8   MCHC   --    --    --   33.4   RDW  13.8  13.4   < >  14.4   PLT  567*  335   < >  256    < > = values in this interval not displayed.       Last Basic Metabolic Panel:  Recent Labs   Lab Test 03/23/17 02/13/17   NA  138  135*   POTASSIUM  4.8  4.5   CHLORIDE  104  102   DAYDAY  9.9  9.8   CO2  22  22   BUN  31*  40*   CR  1.07*  1.12*   GLC  167*  128*     GFR Estimate   Date Value Ref Range Status   03/23/2017 44 (L) >60 mL/min/1.73m2 Final   02/13/2017 42 (L) >60 ml/min/1.73m2 Final   02/08/2017 39 (L) >60 ml/min/1.73m2 Final   11/21/2016 34 ml/min/1.73m2 Final   10/03/2016 39 >60 ml/min/1.73m2 Final     Assessment/Plan:  (S81.801D) Wound of right leg, subsequent encounter  (primary encounter diagnosis)  Comment: Ongoing  Plan: Decrease dressing changes to daily.  Nsg to notify NP and nsg manager of any adverse changes.  Will try kimberly wrap vs kerlix to determine if the edge of the Kerlix is contributing to redness on foot at edge of wound.    (R23.4) Eschar of heel  Comment: Ongoing  Plan: Continue skin prep to area daily.  Monitor.    (R23.4) Eschar of toe  Comment: Ongoing  Plan: Change tx to xeroform and dry dressing.  Monitor    (L89.152) Decubitus ulcer of sacral region, stage II  Comment: Ongoing  Plan: Continue replicare and off loading.  Pt is up for meals only.  Monitor.    (R53.2) Functional quadriplegia (HCC), due to advanced dementia  Comment: Comment: Status: Chronic  - decline expected. Functional quadriplegia due to:  End Stage Dementia. Is totally dependent on staff for all cares.   Plan:  Continue current  psychological and physical support. Monitor skin integrity, weight and comfort levels. Refer to advanced care plan/POLST.    Electronically signed by  SHELLI Hussein CNP

## 2017-04-10 NOTE — PROGRESS NOTES
[place] [facility]    Chief Complaint   Patient presents with     RECHECK     [facility]     HPI:    Leigha Keenan is a 89 year old  (1/8/1922), who is being seen today for an episodic care visit at [facility]. Today's concern is:  Wound of right leg, subsequent encounter  Eschar continues to soften and lift off of wound bed, now exposing a small amt of tendon.  Dressing changed daily with premedication with morphine.    Functional quadriplegia (HCC), due to advanced dementia  Resident is totally dependent on staff for: Bed/chair repositioning, ADL's, feeding, toileting (incontinent of bowel and bladder), transfers (mechanical lift).  Resident is non-ambulatory and has no purposeful movement.      Loss of weight  Loss of 15 lbs in past month.  Now with limited swallow.  No food over the past 4 days and just sips of fluid.      Constipation.  No BM reported since 4/6.    ALLERGIES: Penicillins and Sulfa drugs  Past Medical, Surgical, Family and Social History reviewed and updated in [facility].    Current Outpatient Prescriptions   Medication Sig Dispense Refill     Morphine Sulfate (ROXANOL PO) Place 0.125 mLs under the tongue 30 minutes prior to R shin treatment       LORazepam (ATIVAN PO) Take 0.5 mg by mouth every 4 hours as needed for anxiety       HALOPERIDOL PO Take 0.5 mg by mouth every 4 hours as needed for agitation       atropine 1 % ophthalmic solution Place 2 drops under the tongue every 4 hours as needed       PROCHLORPERAZINE MALEATE PO Take 10 mg by mouth every 6 hours as needed for nausea or vomiting       acetaminophen (TYLENOL) 650 MG Suppository Place 650 mg rectally every 6 hours as needed for fever       sennosides (SENOKOT) 8.6 MG tablet Take 1 tablet by mouth 2 times daily as needed for constipation       Nutritional Supplements (NUTRITIONAL SUPPLEMENT PO) Take 4 oz by mouth 2 times daily       lisinopril (PRINIVIL,ZESTRIL) 10 MG tablet 15 mg qd. 90  tablet 1     morphine sulfate HIGH CONCENTRATE (ROXANOL *CONCETRATED*) 100 MG/5ML concentrated solution Take 0.125 mLs (2.5 mg) by mouth every 4 hours as needed for shortness of breath / dyspnea or moderate to severe pain 30 mL 0     metoprolol (LOPRESSOR) 25 MG tablet Take 3 tablets (75 mg) by mouth 2 times daily 60 tablet      amLODIPine (NORVASC) 10 MG tablet Take 1 tablet (10 mg) by mouth daily 90 tablet 3     albuterol (2.5 MG/3ML) 0.083% nebulizer solution Take 1 vial by nebulization every 4 hours as needed for shortness of breath / dyspnea or wheezing       ARTIFICIAL TEARS 0.1-0.3 % SOLN Apply 2 drops to eye 3 times daily       Polyethylene Glycol 3350 (MIRALAX PO) Take 17 g by mouth daily.        latanoprost (XALATAN) 0.005 % ophthalmic solution Place 1 drop into both eyes At Bedtime.       acetaminophen (TYLENOL) 500 MG tablet Take 1,000 mg by mouth 3 times daily.       Medications reviewed:  Medications reconciled to facility chart and changes were made to reflect current medications as identified as above med list. Below are the changes that were made:   Medications stopped since last EPIC medication reconciliation:   Medications Discontinued During This Encounter   Medication Reason     doxycycline (VIBRAMYCIN) 100 MG capsule Therapy completed       Medications started since last [facility] medication reconciliation:  Orders Placed This Encounter   Medications     Morphine Sulfate (ROXANOL PO)     Sig: Place 0.125 mLs under the tongue 30 minutes prior to R shin treatment     Patient Active Problem List   Diagnosis     Osteoporosis     Hyperlipidemia     Glaucoma     DJD (degenerative joint disease)     Foreign body in eyeball, left incidentally noted on head CT - MRI contraindicated     Advance Care Planning     Osteoarthritis of multiple joints, unspecified osteoarthritis type     Dementia without behavioral disturbance, unspecified dementia type     Slow transit constipation     Functional quadriplegia  "(HCC), due to advanced dementia     Benign essential hypertension     PVD (peripheral vascular disease) (H)     Wound of right leg, subsequent encounter     Open wound of right great toe     Loss of weight     Decubitus skin ulcer, stage II, coccyx     CKD (chronic kidney disease) stage 3, GFR 30-59 ml/min, GFR: 10/3/16: 39, 2/8/17: 39       REVIEW OF SYSTEMS:  Unobtainable secondary to cognitive impairment or aphasia.    Physical Exam:  /72  Pulse 92  Temp 97.5  F (36.4  C)  Resp 16  Ht 5' 1\" (1.549 m)  Wt 121 lb (54.9 kg)  BMI 22.86 kg/m2  GENERAL APPEARANCE: White, female sitting up in reclining chair.  No verbalization.  Calm.  Eyes oipen. Pink facial color .   ENT:  Unable to follow commands to open mouth but forward aspect of oral cavity is moist..   EYES:  Sclera clear, conjunctiva pink.  No drainage.  RESP: Quiet, effortless respirations. No cough, but diminished breaths sounds bilateral lower lobes.   CV: RRR, No edema of lower extremities.  . Skin warm and dry. DP pulses +1 bilateral. Skin of foot is pink without discoloration.  ABDOMEN: Abdomen is soft, non-tender.  Bowel sounds positive all four quadrants..  SKIN:  Dressing removed from lower leg. Continues with softening dark eschar over the majority of the shin wound.  Distal medial aspect has  revealing a small amt of tendon. Lower wound has granulating tissue with some slough.  No obvious pain with viewing of wound and reapplication of dressing.  Minimal manipulation of leg.     Recent Labs:    CBC RESULTS:   Recent Labs   Lab Test 03/23/17 02/13/17 06/20/13   WBC  19.1*  8.6   < >  14   RBC  3.75  4.27   < >  4.18   HGB  11.1*  12.7   < >  12.9   HCT  34.8*  39.5   < >  38.6   MCV  92.8  92.5   < >  92   MCH   --    --    --   30.8   MCHC   --    --    --   33.4   RDW  13.8  13.4   < >  14.4   PLT  567*  335   < >  256    < > = values in this interval not displayed.       Last Basic Metabolic Panel:  Recent Labs   Lab Test " 03/23/17 02/13/17   NA  138  135*   POTASSIUM  4.8  4.5   CHLORIDE  104  102   DAYDAY  9.9  9.8   CO2  22  22   BUN  31*  40*   CR  1.07*  1.12*   GLC  167*  128*     GFR Estimate   Date Value Ref Range Status   03/23/2017 44 (L) >60 mL/min/1.73m2 Final   02/13/2017 42 (L) >60 ml/min/1.73m2 Final   02/08/2017 39 (L) >60 ml/min/1.73m2 Final   11/21/2016 34 ml/min/1.73m2 Final   10/03/2016 39 >60 ml/min/1.73m2 Final     Family Communication;  Daughter Uzma updated with current status.  She recognizes the status of her mother. Continues with comfort cares.  She will bring her father today and this examiner will meet with him to help with his grieving.    Assessment/Plan:  (S81.801D) Wound of right leg, subsequent encounter  (primary encounter diagnosis)  Comment: Ongoing  Plan: Continue daily wound care with premedication with morphine    (R53.2) Functional quadriplegia (HCC), due to advanced dementia  Comment: Status: Chronic  - decline expected. Functional quadriplegia due to:  End Stage Dementia. Is totally dependent on staff for all cares.   Plan:  Continue current psychological and physical support. Monitor skin integrity, weight and comfort levels. Continue hospice cares.  DC oral meds except for comfort meds.    (R63.4) Loss of weight  Comment: Ongoing, anticipate end of life within days to weeks.  Plan:  Continue with hospice care.    (K59.01) Slow transit constipation  Comment: Ongoing  Plan: Dulcolax supp R now.  Then start suppository qod.  Monitor for effectiveness.    Total time spent with patient visit was 35 min including patient visit, review of past records and phone call to patient contact. Greater than 50% of total time spent with counseling and coordinating care.    Electronically signed by  SHELLI Hussein CNP

## 2017-04-12 NOTE — PROGRESS NOTES
[facility]    Chief Complaint   Patient presents with     Nursing Home Acute     [facility]       HPI:    Leigha Keenan is a 89 year old  (1/8/1922), who is being seen today for an episodic care visit at [facility]. Today's concern is:  Adult failure to thrive  Pt is declining.  No longer taking food or fluids.  No use of prn morphine in past 24 hrs.  Facial wincing with touch this morning.  Periods of apnea.  No reports of mottling.  On hospice care.    Dementia without behavioral disturbance, unspecified dementia type  End stage and comfort is goal of tx.     Wound of right leg, subsequent encounter  Dressing not changes this morning by wound team due to pt's decline and nearing end of life.        ALLERGIES: Penicillins and Sulfa drugs  Past Medical, Surgical, Family and Social History reviewed and updated in EPIC.    Current Outpatient Prescriptions   Medication Sig Dispense Refill     Morphine Sulfate (ROXANOL PO) Place 0.125 mLs under the tongue 30 minutes prior to R shin treatment       bisacodyl (DULCOLAX) 10 MG Suppository Place 1 suppository (10 mg) rectally every other day 25 suppository 1     LORazepam (ATIVAN PO) Take 0.5 mg by mouth every 4 hours as needed for anxiety       HALOPERIDOL PO Take 0.5 mg by mouth every 4 hours as needed for agitation       atropine 1 % ophthalmic solution Place 2 drops under the tongue every 4 hours as needed       PROCHLORPERAZINE MALEATE PO Take 10 mg by mouth every 6 hours as needed for nausea or vomiting       acetaminophen (TYLENOL) 650 MG Suppository Place 650 mg rectally every 6 hours as needed for fever       sennosides (SENOKOT) 8.6 MG tablet Take 1 tablet by mouth 2 times daily as needed for constipation       Nutritional Supplements (NUTRITIONAL SUPPLEMENT PO) Take 4 oz by mouth 2 times daily       morphine sulfate HIGH CONCENTRATE (ROXANOL *CONCETRATED*) 100 MG/5ML concentrated solution Take 0.125 mLs (2.5  mg) by mouth every 4 hours as needed for shortness of breath / dyspnea or moderate to severe pain 30 mL 0     albuterol (2.5 MG/3ML) 0.083% nebulizer solution Take 1 vial by nebulization every 4 hours as needed for shortness of breath / dyspnea or wheezing       ARTIFICIAL TEARS 0.1-0.3 % SOLN Apply 2 drops to eye 3 times daily       latanoprost (XALATAN) 0.005 % ophthalmic solution Place 1 drop into both eyes At Bedtime.       Medications reviewed:  Medications reconciled to facility chart and changes were made to reflect current medications as identified as above med list. Below are the changes that were made:   Medications stopped since last EPIC medication reconciliation:   There are no discontinued medications.    Medications started since last [facility] medication reconciliation:  No orders of the defined types were placed in this encounter.    Patient Active Problem List   Diagnosis     Osteoporosis     Hyperlipidemia     Glaucoma     DJD (degenerative joint disease)     Foreign body in eyeball, left incidentally noted on head CT - MRI contraindicated     Advance Care Planning     Osteoarthritis of multiple joints, unspecified osteoarthritis type     Dementia without behavioral disturbance, unspecified dementia type     Slow transit constipation     Functional quadriplegia (HCC), due to advanced dementia     Benign essential hypertension     PVD (peripheral vascular disease) (H)     Wound of right leg, subsequent encounter     Open wound of right great toe     Loss of weight     Decubitus skin ulcer, stage II, coccyx     CKD (chronic kidney disease) stage 3, GFR 30-59 ml/min, GFR: 10/3/16: 39, 2/8/17: 39       REVIEW OF SYSTEMS:  Unobtainable secondary to cognitive impairment or aphasia    Physical Exam:  Pulse 98  Resp 20  GENERAL APPEARANCE: White, female resting in bed.  No verbalization.  Facial wincing when touched. Eyes closed. Pink facial color .   ENT:  Unable to follow commands to open mouth but forward  aspect of oral cavity is dry...   EYES:  Sclera clear, conjunctiva pink.  No drainage.  RESP: Quiet, effortless respirations with occasional apnea. No cough, but diminished breaths sounds bilateral lower lobes.   CV: RRR, No edema of lower extremities.  . Skin warm and dry. Skin of foot is pink without discoloration.  No mottling..  ABDOMEN: Abdomen is soft, non-tender.  Bowel sounds sluggish.but had BM today. ..  SKIN:  Dressing intact right lower leg.   NEURO:  No purposeful movement.  Facial wincing with touch.  No verbalization and eyes closed.    Recent Labs:    CBC RESULTS:   Recent Labs   Lab Test 03/23/17 02/13/17 06/20/13   WBC  19.1*  8.6   < >  14   RBC  3.75  4.27   < >  4.18   HGB  11.1*  12.7   < >  12.9   HCT  34.8*  39.5   < >  38.6   MCV  92.8  92.5   < >  92   MCH   --    --    --   30.8   MCHC   --    --    --   33.4   RDW  13.8  13.4   < >  14.4   PLT  567*  335   < >  256    < > = values in this interval not displayed.       Last Basic Metabolic Panel:  Recent Labs   Lab Test 03/23/17 02/13/17   NA  138  135*   POTASSIUM  4.8  4.5   CHLORIDE  104  102   DAYDAY  9.9  9.8   CO2  22  22   BUN  31*  40*   CR  1.07*  1.12*   GLC  167*  128*     GFR Estimate   Date Value Ref Range Status   03/23/2017 44 (L) >60 mL/min/1.73m2 Final   02/13/2017 42 (L) >60 ml/min/1.73m2 Final   02/08/2017 39 (L) >60 ml/min/1.73m2 Final   11/21/2016 34 ml/min/1.73m2 Final   10/03/2016 39 >60 ml/min/1.73m2 Final     Hospice updated:  They will be arrive shortly.    Family Communication:  Daughter Uzma Sampson updated.  Notified her of ongoing decline.  Agreed with plan to schedule morphine q6h.  Will continue to update with changes.    Assessment/Plan:  (R62.7) Adult failure to thrive  (primary encounter diagnosis)  Comment: Ongoing decline  Plan: Schedule morphine q6h and continue with prn.  DC eye drops.  Monitor for comfort.  Hospice to see shortly.    (F03.90) Dementia without behavioral disturbance, unspecified dementia  type  Comment: End stage  Plan: Same as above.      (Q29.061W) Wound of right leg, subsequent encounter  Comment: Ongoing, but end of life is approaching  Plan: Continue current POC.    Total time spent with patient visit was 35 min including patient visit, review of past records and phone call to patient contact. Greater than 50% of total time spent with counseling and coordinating care.    Electronically signed by  SHELLI Hussein CNP

## 2017-04-13 NOTE — PROGRESS NOTES
[facility]    Chief Complaint   Patient presents with     RECHECK     Home Care/Hospice     HPI:    Leigha Keenan is a 89 year old  (1/8/1922), who is being seen today for an episodic care visit at [facility]. Today's concern is:  Failure to thrive in adult  Pt continues to slowly decline. Remains on hospice care.  No fluid intake in past 48 hours.  On small dose of scheduled morphine and appears comfortable.  Short episodes of apnea. No mottling.  Family has been in attendance much of the time.    Functional quadriplegia (HCC), due to advanced dementia  Resident is totally dependent on staff for: Bed/chair repositioning, ADL's, feeding, toileting (incontinent of bowel and bladder), transfers (mechanical lift).  Resident is non-ambulatory and has no purposeful movement.      Dementia without behavioral disturbance, unspecified dementia type  End stage dementia and is approaching end of life   Continues with leg wound and coccyx wound.      ALLERGIES: Penicillins and Sulfa drugs  Past Medical, Surgical, Family and Social History reviewed and updated in [facility].    Current Outpatient Prescriptions   Medication Sig Dispense Refill     albuterol (2.5 MG/3ML) 0.083% neb solution Take 1 vial by nebulization every 4 hours as needed for shortness of breath / dyspnea or wheezing       morphine sulfate HIGH CONCENTRATE (ROXANOL *CONCENTRATED*) 20 mg/mL (HIGH CONC) solution 2.5 mg q6h and 2.5 mg po/slq4h prn 30 mL 0     bisacodyl (DULCOLAX) 10 MG Suppository Place 1 suppository (10 mg) rectally every other day 25 suppository 1     LORazepam (ATIVAN PO) Take 0.5 mg by mouth every 4 hours as needed for anxiety       HALOPERIDOL PO Take 0.5 mg by mouth every 4 hours as needed for agitation       atropine 1 % ophthalmic solution Place 2 drops under the tongue every 4 hours as needed       PROCHLORPERAZINE MALEATE PO Take 10 mg by mouth every 6 hours as needed for nausea or vomiting        "acetaminophen (TYLENOL) 650 MG Suppository Place 650 mg rectally every 6 hours as needed for fever       sennosides (SENOKOT) 8.6 MG tablet Take 1 tablet by mouth 2 times daily as needed for constipation       Medications reviewed:  Medications reconciled to facility chart and changes were made to reflect current medications as identified as above med list. Below are the changes that were made:   Medications stopped since last EPIC medication reconciliation:   Medications Discontinued During This Encounter   Medication Reason     latanoprost (XALATAN) 0.005 % ophthalmic solution Therapy completed       Medications started since last [facility] medication reconciliation:  Orders Placed This Encounter   Medications     albuterol (2.5 MG/3ML) 0.083% neb solution     Sig: Take 1 vial by nebulization every 4 hours as needed for shortness of breath / dyspnea or wheezing     Patient Active Problem List   Diagnosis     Osteoporosis     Hyperlipidemia     Glaucoma     DJD (degenerative joint disease)     Foreign body in eyeball, left incidentally noted on head CT - MRI contraindicated     Advance Care Planning     Osteoarthritis of multiple joints, unspecified osteoarthritis type     Dementia without behavioral disturbance, unspecified dementia type     Slow transit constipation     Functional quadriplegia (HCC), due to advanced dementia     Benign essential hypertension     PVD (peripheral vascular disease) (H)     Wound of right leg, subsequent encounter     Open wound of right great toe     Loss of weight     Decubitus skin ulcer, stage II, coccyx     CKD (chronic kidney disease) stage 3, GFR 30-59 ml/min, GFR: 10/3/16: 39, 2/8/17: 39     REVIEW OF SYSTEMS:  Unobtainable secondary to cognitive impairment or aphasia    Physical Exam:  /64  Pulse 98  Temp 97.7  F (36.5  C)  Resp 14  Ht 5' 1\" (1.549 m)  Wt 121 lb (54.9 kg)  BMI 22.86 kg/m2  GENERAL APPEARANCE: White, female resting in bed.  No verbalization. Eyes open " throughout visit. Pink facial color .   ENT:  Unable to follow commands to open mouth but forward aspect of oral cavity is dry...   EYES:  Sclera clear, conjunctiva pink.  No drainage.  RESP: Quiet, effortless respirations with occasional apnea. No cough, but diminished breaths sounds bilateral lower lobes.   CV: RRR, No edema of lower extremities.  . Skin warm and dry. Skin of foot is pink without discoloration.  No mottling..  ABDOMEN: Abdomen is soft, non-tender.  Bowel sounds sluggish.but had BM today. ..  SKIN:  Dressing intact right lower leg.   NEURO:  No purposeful movement.   No verbalization but eyes are open.    Recent Labs:    CBC RESULTS:   Recent Labs   Lab Test 03/23/17 02/13/17 06/20/13   WBC  19.1*  8.6   < >  14   RBC  3.75  4.27   < >  4.18   HGB  11.1*  12.7   < >  12.9   HCT  34.8*  39.5   < >  38.6   MCV  92.8  92.5   < >  92   MCH   --    --    --   30.8   MCHC   --    --    --   33.4   RDW  13.8  13.4   < >  14.4   PLT  567*  335   < >  256    < > = values in this interval not displayed.       Last Basic Metabolic Panel:  Recent Labs   Lab Test 03/23/17 02/13/17   NA  138  135*   POTASSIUM  4.8  4.5   CHLORIDE  104  102   DAYDAY  9.9  9.8   CO2  22  22   BUN  31*  40*   CR  1.07*  1.12*   GLC  167*  128*     GFR Estimate   Date Value Ref Range Status   03/23/2017 44 (L) >60 mL/min/1.73m2 Final   02/13/2017 42 (L) >60 ml/min/1.73m2 Final   02/08/2017 39 (L) >60 ml/min/1.73m2 Final   11/21/2016 34 ml/min/1.73m2 Final   10/03/2016 39 >60 ml/min/1.73m2 Final     Family Communication:  Daughter Uzma updated via voicemail.    Assessment/Plan:  (R62.7) Failure to thrive in adult  (primary encounter diagnosis)  Comment: Ongoing decline  Plan: Continue hospice care.    (R53.2) Functional quadriplegia (HCC), due to advanced dementia  Comment: Status: Chronic  - decline expected. 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 Is totally dependent on staff for all cares.   Plan:  Continue current  psychological and physical support. Monitor skin integrity, weight and comfort levels. Continue hospice care.    (F03.90) Dementia without behavioral disturbance, unspecified dementia type  Comment:  End stage  Plan: Continue current POC with hospice.    Electronically signed by  SHELLI Hussein CNP

## 2024-01-01 NOTE — PROGRESS NOTES
[facility]    Chief Complaint   Patient presents with     Nursing Home Acute     HPI:    Leigha Keenan is a 89 year old  (1/8/1922), who is being seen today for an episodic care visit at [facility]. Today's concern is:  Loss of weight  Pt has had a 10-14 lbs weight loss from February to early March.  Now with new coccyx wound. Family has been discussing hospice and Uzma (daughter) is choosing to meet with hospice.  They would like to meet with the [facility] hosp[facility] team, but will delay appt to next week as her brother will not be back into town until then.  She will discuss this with her dad.    Wound of right leg, subsequent encounter  Continues with daily treatment.     Decubitus skin ulcer, stage II, coccyx  New open area on coccyx discovered yesterday.  Area treated with replicare and off loading.  Pt will be up for meals only.    Dementia without behavioral disturbance, unspecified dementia type  End stage disease.  Relies on staff for all cares.  Nsg notes that her appetite has declined. Will eat well at some meals and then will not eat at other times.  Is taking supplement.      ALLERGIES: Penicillins and Sulfa drugs  Past Medical, Surgical, Family and Social History reviewed and updated in [facility].    Current Outpatient Prescriptions   Medication Sig Dispense Refill     Nutritional Supplements (NUTRITIONAL SUPPLEMENT PO) Take 4 oz by mouth 2 times daily       lisinopril (PRINIVIL,ZESTRIL) 10 MG tablet 15 mg qd. 90 tablet 1     morphine sulfate HIGH CONCENTRATE (ROXANOL *CONCETRATED*) 100 MG/5ML concentrated solution Take 0.125 mLs (2.5 mg) by mouth every 4 hours as needed for shortness of breath / dyspnea or moderate to severe pain 30 mL 0     metoprolol (LOPRESSOR) 25 MG tablet Take 3 tablets (75 mg) by mouth 2 times daily 60 tablet      amLODIPine (NORVASC) 10 MG tablet Take 1 tablet (10 mg) by mouth daily 90 tablet 3     sodium chloride (OCEAN) 0.65 % nasal spray  Spray 2 sprays into both nostrils daily as needed for congestion 2 sprays to each nostril tid and prn dx nasal dryness.       albuterol (2.5 MG/3ML) 0.083% nebulizer solution Take 1 vial by nebulization every 4 hours as needed for shortness of breath / dyspnea or wheezing       ARTIFICIAL TEARS 0.1-0.3 % SOLN Apply 2 drops to eye 3 times daily       Polyethylene Glycol 3350 (MIRALAX PO) Take 17 g by mouth daily.        latanoprost (XALATAN) 0.005 % ophthalmic solution Place 1 drop into both eyes At Bedtime.       acetaminophen (TYLENOL) 500 MG tablet Take 1,000 mg by mouth 3 times daily.       Medications reviewed:  Medications reconciled to facility chart and changes were made to reflect current medications as identified as above med list. Below are the changes that were made:   Medications stopped since last EPIC medication reconciliation:   There are no discontinued medications.    Medications started since last [facility] medication reconciliation:  Orders Placed This Encounter   Medications     Nutritional Supplements (NUTRITIONAL SUPPLEMENT PO)     Sig: Take 4 oz by mouth 2 times daily     Patient Active Problem List   Diagnosis     Osteoporosis     Hyperlipidemia     Glaucoma     DJD (degenerative joint disease)     Foreign body in eyeball, left incidentally noted on head CT - MRI contraindicated     Advance Care Planning     Osteoarthritis of multiple joints, unspecified osteoarthritis type     Dementia without behavioral disturbance, unspecified dementia type     Slow transit constipation     Functional quadriplegia (HCC), due to advanced dementia     Benign essential hypertension     PVD (peripheral vascular disease) (H)     Wound of right leg, subsequent encounter     Open wound of right great toe     Loss of weight     Decubitus skin ulcer, stage II, coccyx     REVIEW OF SYSTEMS:  Unobtainable secondary to cognitive impairment or aphasia.    Physical Exam:  /65  Pulse 90  Temp 97.7  F (36.5  C)  Resp 16   "Ht 5' 1\" (1.549 m)  Wt 126 lb (57.2 kg)  BMI 23.81 kg/m2  GENERAL APPEARANCE: White, female resting in bed.  Seen with unit nurse.  No verbalization.  Calm.  Alert. Pink facial color .   ENT:  Unable to follow commands to open mouth but forward aspect of oral cavity is moist..   EYES:  Sclera clear, conjunctiva pink.  No drainage.  RESP: Quiet, effortless respirations. No cough, but diminished breaths sounds bilateral lower lobes.   CV: RRR, No edema of lower extremities.  . Skin warm and dry. DP pulses +1 bilateral. Skin of foot is pink without discoloration.  ABDOMEN: Abdomen is soft, non-tender.  Bowel sounds positive all four quadrants..  SKIN:  Large wound on right shin involving most of anterior lower leg has now split into two with a small area of healing, non eschar tissue between the distal and proximal wounds. No measurements taken as the wound team is seeing him in the morning.  Entire eschar has softened and is  from the skin edges. . Area between distal eschar and shin eschar is granulating tissue and appears to be healing.   Shin area cleansed and covered with xeroform and kerlix. Skin prep applied to intact skin surrounding wound.   Coccyx wound has intact replicare dressing in place. .  PSYCH: Quiet but awake..      Recent Labs:    CBC RESULTS:   Recent Labs   Lab Test 02/13/17 02/08/17 06/20/13   WBC  8.6  7.8   < >  14   RBC  4.27  4.57   < >  4.18   HGB  12.7  13.9   < >  12.9   HCT  39.5  42.0   < >  38.6   MCV  92.5  91.9   < >  92   MCH   --    --    --   30.8   MCHC   --    --    --   33.4   RDW  13.4  12.9   < >  14.4   PLT  335  348   < >  256    < > = values in this interval not displayed.       Last Basic Metabolic Panel:  Recent Labs   Lab Test 02/13/17 02/08/17   NA  135*  135*   POTASSIUM  4.5  5.0   CHLORIDE  102  102   DAYDAY  9.8  10.0   CO2  22  22   BUN  40*  33*   CR  1.12*  1.19*   GLC  128*  103*     GFR Estimate   Date Value Ref Range Status   02/13/2017 42 (L) >60 " ml/min/1.73m2 Final   02/08/2017 39 (L) >60 ml/min/1.73m2 Final   11/21/2016 34 ml/min/1.73m2 Final   10/03/2016 39 >60 ml/min/1.73m2 Final   07/21/2016 46 (A) 61 ml/min/1.73m2 Final     Family Communication:  Discussed Leigha's current condition at length with daughter Uzma.  See above comments.  Will refer to hospice.    Assessment/Plan:  (R63.4) Loss of weight  (primary encounter diagnosis)  Comment: Secondary to advancing dementia  Plan: Refer to [facility] care.  Dr. Perla to see on Thursday.  BMP and Hgb on Thursday. Continue supplements.    (S81.801D) Wound of right leg, subsequent encounter  Comment: Ongoing  Plan: Continue POC.    (L89.92) Decubitus skin ulcer, stage II, coccyx  Comment: Acute  Plan: Continue replicare and off loading.  Monitor.    (F03.90) Dementia without behavioral disturbance, unspecified dementia type  Comment: Advanced  Plan: Continue current POC with comfort being the focus.    Total time spent with patient visit was 35 min including patient visit, review of past records and phone call to patient contact. Greater than 50% of total time spent with counseling and coordinating care.    Electronically signed by  SHELLI Hussein CNP                   hard copy, drawn during this pregnancy